# Patient Record
Sex: MALE | HISPANIC OR LATINO | Employment: FULL TIME | URBAN - METROPOLITAN AREA
[De-identification: names, ages, dates, MRNs, and addresses within clinical notes are randomized per-mention and may not be internally consistent; named-entity substitution may affect disease eponyms.]

---

## 2020-10-16 ENCOUNTER — OFFICE VISIT (OUTPATIENT)
Dept: ENDOCRINOLOGY | Facility: CLINIC | Age: 60
End: 2020-10-16

## 2020-10-16 VITALS
SYSTOLIC BLOOD PRESSURE: 124 MMHG | HEART RATE: 77 BPM | OXYGEN SATURATION: 98 % | BODY MASS INDEX: 35.17 KG/M2 | WEIGHT: 206 LBS | HEIGHT: 64 IN | TEMPERATURE: 97.3 F | DIASTOLIC BLOOD PRESSURE: 70 MMHG

## 2020-10-16 DIAGNOSIS — IMO0002 DIABETES MELLITUS TYPE 2, UNCONTROLLED, WITH COMPLICATIONS: Primary | ICD-10-CM

## 2020-10-16 DIAGNOSIS — I10 BENIGN HYPERTENSION: ICD-10-CM

## 2020-10-16 LAB
EXPIRATION DATE: NORMAL
HBA1C MFR BLD: 8 %
Lab: NORMAL

## 2020-10-16 PROCEDURE — 83036 HEMOGLOBIN GLYCOSYLATED A1C: CPT | Performed by: INTERNAL MEDICINE

## 2020-10-16 PROCEDURE — 99213 OFFICE O/P EST LOW 20 MIN: CPT | Performed by: INTERNAL MEDICINE

## 2020-10-16 RX ORDER — EMPAGLIFLOZIN 25 MG/1
1 TABLET, FILM COATED ORAL DAILY
COMMUNITY
End: 2021-01-22 | Stop reason: SDUPTHER

## 2020-10-16 RX ORDER — BLOOD SUGAR DIAGNOSTIC
STRIP MISCELLANEOUS
COMMUNITY
End: 2021-01-22 | Stop reason: SDUPTHER

## 2020-10-16 RX ORDER — GLIMEPIRIDE 4 MG/1
1 TABLET ORAL 2 TIMES DAILY
COMMUNITY
End: 2021-01-22 | Stop reason: SDUPTHER

## 2020-10-16 RX ORDER — ROSUVASTATIN CALCIUM 20 MG/1
1 TABLET, COATED ORAL NIGHTLY
COMMUNITY
End: 2021-01-22 | Stop reason: SDUPTHER

## 2020-10-16 RX ORDER — PIOGLITAZONEHYDROCHLORIDE 45 MG/1
1 TABLET ORAL DAILY
COMMUNITY
End: 2021-01-22 | Stop reason: SDUPTHER

## 2020-10-16 RX ORDER — LISINOPRIL 40 MG/1
1 TABLET ORAL DAILY
COMMUNITY
End: 2021-01-22 | Stop reason: SDUPTHER

## 2020-10-16 RX ORDER — SEMAGLUTIDE 1.34 MG/ML
INJECTION, SOLUTION SUBCUTANEOUS
COMMUNITY
End: 2021-01-22 | Stop reason: SDUPTHER

## 2020-10-16 NOTE — PROGRESS NOTES
"     Office Note      Date: 10/16/2020  Patient Name: Vu Alberto  MRN: [id number]  : 1960    Chief Complaint   Patient presents with   • Follow-up   • Diabetes       History of Present Illness:   Vu Alberto is a 60 y.o. male who presents for Diabetes type 2. Diagnosed in: . Treated in past with oral agents. Current treatments: ozempic and oral agents. Number of insulin shots per day: none. Checks blood sugar 1 times a day. Has low blood sugar: no. Aspirin use: Yes. Statin use: Yes. ACE-I/ARB use: Yes. Changes in health since last visit: none. Last eye exam .    Subjective      Diabetic Complications:  Eyes: No  Kidneys: No  Feet: No  Heart: No    Diet and Exercise:  Meals per day: 3  Minutes of exercise per week: 150 mins.    Review of Systems:   Review of Systems   Constitutional: Negative.    Cardiovascular: Negative.    Gastrointestinal: Negative.    Endocrine: Negative.        The following portions of the patient's history were reviewed and updated as appropriate: allergies, current medications, past family history, past medical history, past social history, past surgical history and problem list.    Objective       Visit Vitals  /70 (BP Location: Left arm, Patient Position: Sitting, Cuff Size: Adult)   Pulse 77   Temp 97.3 °F (36.3 °C)   Ht 162.6 cm (64\")   Wt 93.4 kg (206 lb)   SpO2 98%   BMI 35.36 kg/m²       Physical Exam:  Physical Exam  Constitutional:       Appearance: Normal appearance.   Neurological:      Mental Status: He is alert.         Labs:    HbA1c  Lab Results   Component Value Date    HGBA1C 8.0 10/16/2020       CMP  No results found for: GLUCOSE, BUN, CREATININE, EGFRIFNONA, EGFRIFAFRI, BCR, K, CO2, CALCIUM, PROTENTOTREF, LABIL2, BILIRUBIN, AST, ALT     Lipid Panel        TSH  No results found for: TSH, FREET4     Hemoglobin A1C  Lab Results   Component Value Date    HGBA1C 8.0 10/16/2020        Microalbumin/Creatinine  No results found for: ROBERT " CREATINIURIN, MICROALBUR        Assessment / Plan      Assessment & Plan:  Problem List Items Addressed This Visit        Cardiovascular and Mediastinum    Benign hypertension    Current Assessment & Plan     Hypertension is unchanged.  Continue current treatment regimen.  Blood pressure will be reassessed in 3 months.         Relevant Medications    lisinopril (PRINIVIL,ZESTRIL) 40 MG tablet       Endocrine    Diabetes mellitus type 2, uncontrolled, with complications (CMS/HCC) - Primary    Current Assessment & Plan     Diabetes is unchanged.   Continue current treatment regimen.  Diabetes will be reassessed in 3 months.    Work on diet/exercise/weight loss.         Relevant Medications    glimepiride (AMARYL) 4 MG tablet    Empagliflozin (Jardiance) 25 MG tablet    metFORMIN (GLUCOPHAGE) 1000 MG tablet    Semaglutide, 1 MG/DOSE, (Ozempic, 1 MG/DOSE,) 2 MG/1.5ML solution pen-injector    pioglitazone (ACTOS) 45 MG tablet    Other Relevant Orders    POC Glycosylated Hemoglobin (Hb A1C) (Completed)           Return in about 3 months (around 1/16/2021) for Recheck with A1c.    Adan Swain MD   10/16/2020

## 2020-10-16 NOTE — ASSESSMENT & PLAN NOTE
Diabetes is unchanged.   Continue current treatment regimen.  Diabetes will be reassessed in 3 months.    Work on diet/exercise/weight loss.

## 2021-01-22 ENCOUNTER — OFFICE VISIT (OUTPATIENT)
Dept: ENDOCRINOLOGY | Facility: CLINIC | Age: 61
End: 2021-01-22

## 2021-01-22 VITALS
SYSTOLIC BLOOD PRESSURE: 122 MMHG | WEIGHT: 204.4 LBS | HEART RATE: 91 BPM | OXYGEN SATURATION: 98 % | BODY MASS INDEX: 34.89 KG/M2 | TEMPERATURE: 97.5 F | DIASTOLIC BLOOD PRESSURE: 74 MMHG | HEIGHT: 64 IN

## 2021-01-22 DIAGNOSIS — I10 BENIGN HYPERTENSION: ICD-10-CM

## 2021-01-22 DIAGNOSIS — E11.65 UNCONTROLLED TYPE 2 DIABETES MELLITUS WITH HYPERGLYCEMIA (HCC): Primary | ICD-10-CM

## 2021-01-22 LAB
EXPIRATION DATE: NORMAL
HBA1C MFR BLD: 8.3 %
Lab: NORMAL

## 2021-01-22 PROCEDURE — 83036 HEMOGLOBIN GLYCOSYLATED A1C: CPT | Performed by: INTERNAL MEDICINE

## 2021-01-22 PROCEDURE — 99213 OFFICE O/P EST LOW 20 MIN: CPT | Performed by: INTERNAL MEDICINE

## 2021-01-22 RX ORDER — EMPAGLIFLOZIN 25 MG/1
1 TABLET, FILM COATED ORAL DAILY
Qty: 90 TABLET | Refills: 3 | Status: SHIPPED | OUTPATIENT
Start: 2021-01-22 | End: 2022-02-23 | Stop reason: SDUPTHER

## 2021-01-22 RX ORDER — LISINOPRIL 40 MG/1
40 TABLET ORAL DAILY
Qty: 90 TABLET | Refills: 3 | Status: SHIPPED | OUTPATIENT
Start: 2021-01-22 | End: 2021-06-24

## 2021-01-22 RX ORDER — BLOOD SUGAR DIAGNOSTIC
STRIP MISCELLANEOUS
Qty: 100 EACH | Refills: 3 | Status: SHIPPED | OUTPATIENT
Start: 2021-01-22 | End: 2021-07-12

## 2021-01-22 RX ORDER — SEMAGLUTIDE 1.34 MG/ML
1 INJECTION, SOLUTION SUBCUTANEOUS WEEKLY
Qty: 9 ML | Refills: 3 | Status: SHIPPED | OUTPATIENT
Start: 2021-01-22 | End: 2022-02-23 | Stop reason: SDUPTHER

## 2021-01-22 RX ORDER — PIOGLITAZONEHYDROCHLORIDE 45 MG/1
45 TABLET ORAL DAILY
Qty: 90 TABLET | Refills: 3 | Status: SHIPPED | OUTPATIENT
Start: 2021-01-22 | End: 2021-04-06 | Stop reason: SDUPTHER

## 2021-01-22 RX ORDER — ROSUVASTATIN CALCIUM 20 MG/1
20 TABLET, COATED ORAL NIGHTLY
Qty: 90 TABLET | Refills: 3 | Status: SHIPPED | OUTPATIENT
Start: 2021-01-22 | End: 2022-02-23 | Stop reason: SDUPTHER

## 2021-01-22 RX ORDER — GLIMEPIRIDE 4 MG/1
4 TABLET ORAL 2 TIMES DAILY
Qty: 180 TABLET | Refills: 3 | Status: SHIPPED | OUTPATIENT
Start: 2021-01-22 | End: 2022-02-23 | Stop reason: SDUPTHER

## 2021-01-22 NOTE — ASSESSMENT & PLAN NOTE
Diabetes is worsening.   Continue current treatment regimen.  Diabetes will be reassessed in 3 months.    He will try to work on diet/exercise/weight loss for the next 3 months before we add another DM medication.

## 2021-01-22 NOTE — PROGRESS NOTES
"     Office Note      Date: 2021  Patient Name: Vu Alberto  MRN: [id number]  : 1960    Chief Complaint   Patient presents with   • Follow-up   • Diabetes       History of Present Illness:   Vu Alberto is a 60 y.o. male who presents for Diabetes type 2. Diagnosed in: . Treated in past with oral agents. Current treatments: ozempic and oral agents. Number of insulin shots per day: none. Checks blood sugar 1 times a day. Has low blood sugar: no. Aspirin use: Yes. Statin use: Yes. ACE-I/ARB use: Yes. Changes in health since last visit: none. Last eye exam .    Subjective      Diabetic Complications:  Eyes: No  Kidneys: No  Feet: No  Heart: No    Diet and Exercise:  Meals per day: 3  Minutes of exercise per week: 150 mins.    Review of Systems:   Review of Systems   Cardiovascular: Negative.    Gastrointestinal: Negative.    Endocrine: Negative.        The following portions of the patient's history were reviewed and updated as appropriate: allergies, current medications, past family history, past medical history, past social history, past surgical history and problem list.    Objective       Visit Vitals  /74   Pulse 91   Temp 97.5 °F (36.4 °C) (Infrared)   Ht 162.6 cm (64\")   Wt 92.7 kg (204 lb 6.4 oz)   SpO2 98%   BMI 35.09 kg/m²       Physical Exam:  Physical Exam  Constitutional:       Appearance: Normal appearance.   Neurological:      Mental Status: He is alert.         Labs:    HbA1c  Lab Results   Component Value Date    HGBA1C 8.3 2021       CMP  No results found for: GLUCOSE, BUN, CREATININE, EGFRIFNONA, EGFRIFAFRI, BCR, K, CO2, CALCIUM, PROTENTOTREF, LABIL2, BILIRUBIN, AST, ALT     Lipid Panel        TSH  No results found for: TSH, FREET4     Hemoglobin A1C  Lab Results   Component Value Date    HGBA1C 8.3 2021        Microalbumin/Creatinine  No results found for: MALBCRERATIO, CREATINIURIN, MICROALBUR        Assessment / Plan      Assessment & Plan:  Problem List " Items Addressed This Visit        Other    Diabetes mellitus type 2, uncontrolled, with complications (CMS/HCC) - Primary    Current Assessment & Plan     Diabetes is worsening.   Continue current treatment regimen.  Diabetes will be reassessed in 3 months.    He will try to work on diet/exercise/weight loss for the next 3 months before we add another DM medication.         Relevant Medications    Empagliflozin (Jardiance) 25 MG tablet    glimepiride (AMARYL) 4 MG tablet    metFORMIN (GLUCOPHAGE) 1000 MG tablet    pioglitazone (ACTOS) 45 MG tablet    Semaglutide, 1 MG/DOSE, (Ozempic, 1 MG/DOSE,) 2 MG/1.5ML solution pen-injector    Benign hypertension    Current Assessment & Plan     Hypertension is unchanged.  Continue current treatment regimen.  Blood pressure will be reassessed in 3 months.         Relevant Medications    lisinopril (PRINIVIL,ZESTRIL) 40 MG tablet           Return in about 3 months (around 4/22/2021) for Recheck with A1c, CMP, lipids, TSH, microalbumin.    Adan Swain MD   01/22/2021

## 2021-03-29 ENCOUNTER — TELEPHONE (OUTPATIENT)
Dept: ENDOCRINOLOGY | Facility: CLINIC | Age: 61
End: 2021-03-29

## 2021-04-06 RX ORDER — PIOGLITAZONEHYDROCHLORIDE 45 MG/1
45 TABLET ORAL DAILY
Qty: 90 TABLET | Refills: 3 | Status: SHIPPED | OUTPATIENT
Start: 2021-04-06 | End: 2022-02-23 | Stop reason: SDUPTHER

## 2021-05-21 ENCOUNTER — LAB (OUTPATIENT)
Dept: LAB | Facility: HOSPITAL | Age: 61
End: 2021-05-21

## 2021-05-21 ENCOUNTER — OFFICE VISIT (OUTPATIENT)
Dept: ENDOCRINOLOGY | Facility: CLINIC | Age: 61
End: 2021-05-21

## 2021-05-21 VITALS
SYSTOLIC BLOOD PRESSURE: 126 MMHG | HEIGHT: 64 IN | BODY MASS INDEX: 35.07 KG/M2 | DIASTOLIC BLOOD PRESSURE: 78 MMHG | HEART RATE: 80 BPM | WEIGHT: 205.4 LBS | OXYGEN SATURATION: 96 %

## 2021-05-21 DIAGNOSIS — E11.65 UNCONTROLLED TYPE 2 DIABETES MELLITUS WITH HYPERGLYCEMIA (HCC): Primary | ICD-10-CM

## 2021-05-21 DIAGNOSIS — I10 BENIGN HYPERTENSION: ICD-10-CM

## 2021-05-21 DIAGNOSIS — E11.65 UNCONTROLLED TYPE 2 DIABETES MELLITUS WITH HYPERGLYCEMIA (HCC): ICD-10-CM

## 2021-05-21 LAB
ALBUMIN SERPL-MCNC: 4.4 G/DL (ref 3.5–5.2)
ALBUMIN UR-MCNC: 1.5 MG/DL
ALBUMIN/GLOB SERPL: 1.7 G/DL
ALP SERPL-CCNC: 85 U/L (ref 39–117)
ALT SERPL W P-5'-P-CCNC: 29 U/L (ref 1–41)
ANION GAP SERPL CALCULATED.3IONS-SCNC: 12.9 MMOL/L (ref 5–15)
AST SERPL-CCNC: 23 U/L (ref 1–40)
BILIRUB SERPL-MCNC: 0.2 MG/DL (ref 0–1.2)
BUN SERPL-MCNC: 13 MG/DL (ref 8–23)
BUN/CREAT SERPL: 21.7 (ref 7–25)
CALCIUM SPEC-SCNC: 10 MG/DL (ref 8.6–10.5)
CHLORIDE SERPL-SCNC: 96 MMOL/L (ref 98–107)
CHOLEST SERPL-MCNC: 123 MG/DL (ref 0–200)
CO2 SERPL-SCNC: 26.1 MMOL/L (ref 22–29)
CREAT SERPL-MCNC: 0.6 MG/DL (ref 0.76–1.27)
CREAT UR-MCNC: 30.2 MG/DL
EXPIRATION DATE: NORMAL
EXPIRATION DATE: NORMAL
GFR SERPL CREATININE-BSD FRML MDRD: 137 ML/MIN/1.73
GFR SERPL CREATININE-BSD FRML MDRD: >150 ML/MIN/1.73
GLOBULIN UR ELPH-MCNC: 2.6 GM/DL
GLUCOSE BLDC GLUCOMTR-MCNC: 126 MG/DL (ref 70–130)
GLUCOSE SERPL-MCNC: 96 MG/DL (ref 65–99)
HBA1C MFR BLD: 8 %
HDLC SERPL-MCNC: 37 MG/DL (ref 40–60)
LDLC SERPL CALC-MCNC: 53 MG/DL (ref 0–100)
LDLC/HDLC SERPL: 1.23 {RATIO}
Lab: NORMAL
Lab: NORMAL
MICROALBUMIN/CREAT UR: 49.7 MG/G
POTASSIUM SERPL-SCNC: 4.6 MMOL/L (ref 3.5–5.2)
PROT SERPL-MCNC: 7 G/DL (ref 6–8.5)
SODIUM SERPL-SCNC: 135 MMOL/L (ref 136–145)
TRIGL SERPL-MCNC: 202 MG/DL (ref 0–150)
TSH SERPL DL<=0.05 MIU/L-ACNC: 1.63 UIU/ML (ref 0.27–4.2)
VLDLC SERPL-MCNC: 33 MG/DL (ref 5–40)

## 2021-05-21 PROCEDURE — 80053 COMPREHEN METABOLIC PANEL: CPT

## 2021-05-21 PROCEDURE — 83036 HEMOGLOBIN GLYCOSYLATED A1C: CPT | Performed by: INTERNAL MEDICINE

## 2021-05-21 PROCEDURE — 82043 UR ALBUMIN QUANTITATIVE: CPT

## 2021-05-21 PROCEDURE — 82570 ASSAY OF URINE CREATININE: CPT

## 2021-05-21 PROCEDURE — 99214 OFFICE O/P EST MOD 30 MIN: CPT | Performed by: INTERNAL MEDICINE

## 2021-05-21 PROCEDURE — 84443 ASSAY THYROID STIM HORMONE: CPT

## 2021-05-21 PROCEDURE — 82947 ASSAY GLUCOSE BLOOD QUANT: CPT | Performed by: INTERNAL MEDICINE

## 2021-05-21 PROCEDURE — 80061 LIPID PANEL: CPT

## 2021-05-21 RX ORDER — BROMOCRIPTINE MESYLATE 0.8 MG/1
4 TABLET ORAL
Qty: 120 TABLET | Refills: 5 | Status: SHIPPED | OUTPATIENT
Start: 2021-05-21 | End: 2022-02-01

## 2021-05-21 NOTE — PROGRESS NOTES
"     Office Note      Date: 2021  Patient Name: Vu Alberto  MRN: [id number]  : 1960    Chief Complaint   Patient presents with   • Follow-up   • Diabetes       History of Present Illness:   Vu Alberto is a 61 y.o. male who presents for Diabetes type 2. Diagnosed in: . Treated in past with oral agents. Current treatments: ozempic and oral agents. Number of insulin shots per day: none. Checks blood sugar 1 times a day. Has low blood sugar: no. Aspirin use: Yes. Statin use: Yes. ACE-I/ARB use: Yes. Changes in health since last visit: none. Last eye exam .    Subjective      Diabetic Complications:  Eyes: No  Kidneys: No  Feet: No  Heart: No    Diet and Exercise:  Meals per day: 3  Minutes of exercise per week: 150 mins.    Review of Systems:   Review of Systems   Constitutional: Negative.    Cardiovascular: Negative.    Gastrointestinal: Negative.    Endocrine: Negative.        The following portions of the patient's history were reviewed and updated as appropriate: allergies, current medications, past family history, past medical history, past social history, past surgical history and problem list.    Objective       Visit Vitals  /78   Pulse 80   Ht 162.6 cm (64\")   Wt 93.2 kg (205 lb 6.4 oz)   SpO2 96%   BMI 35.26 kg/m²       Physical Exam:  Physical Exam  Constitutional:       Appearance: Normal appearance.   Cardiovascular:      Pulses:           Dorsalis pedis pulses are 2+ on the right side and 2+ on the left side.        Posterior tibial pulses are 2+ on the right side and 2+ on the left side.   Feet:      Right foot:      Protective Sensation: 5 sites tested. 5 sites sensed.      Skin integrity: Skin integrity normal.      Toenail Condition: Right toenails are abnormally thick.      Left foot:      Protective Sensation: 5 sites tested. 5 sites sensed.      Skin integrity: Skin integrity normal.      Toenail Condition: Left toenails are abnormally thick.   Neurological:      " Mental Status: He is alert.         Labs:    HbA1c  Lab Results   Component Value Date    HGBA1C 8.0 05/21/2021       CMP  No results found for: GLUCOSE, BUN, CREATININE, EGFRIFNONA, EGFRIFAFRI, BCR, K, CO2, CALCIUM, PROTENTOTREF, LABIL2, BILIRUBIN, AST, ALT     Lipid Panel        TSH  No results found for: TSH, FREET4     Hemoglobin A1C  Lab Results   Component Value Date    HGBA1C 8.0 05/21/2021        Microalbumin/Creatinine  No results found for: MALBCRERATIO, CREATINIURIN, MICROALBUR        Assessment / Plan      Assessment & Plan:  Diagnoses and all orders for this visit:    1. Uncontrolled type 2 diabetes mellitus with hyperglycemia (CMS/[facility]) (Primary)  Assessment & Plan:  Diabetes is improving with treatment.   Continue current treatment regimen.  Diabetes will be reassessed in 3 months.    A1c slightly better but still above goal.    Orders:  -     POC Glucose, Blood  -     POC Glycosylated Hemoglobin (Hb A1C)  -     Comprehensive Metabolic Panel; Future  -     Lipid Panel; Future  -     Microalbumin / Creatinine Urine Ratio - Urine, Clean Catch; Future  -     TSH; Future    2. Benign hypertension  Assessment & Plan:  Hypertension is unchanged.  Continue current treatment regimen.  Blood pressure will be reassessed at the next regular appointment.        Return in about 3 months (around 8/21/2021) for Recheck with A1c.    Adan Swain MD   05/21/2021

## 2021-05-21 NOTE — ASSESSMENT & PLAN NOTE
Diabetes is improving with treatment.   Continue current treatment regimen.  Diabetes will be reassessed in 3 months.    A1c slightly better but still above goal.  We discussed adding cycloset.

## 2021-05-26 ENCOUNTER — PRIOR AUTHORIZATION (OUTPATIENT)
Dept: ENDOCRINOLOGY | Facility: CLINIC | Age: 61
End: 2021-05-26

## 2021-05-26 NOTE — TELEPHONE ENCOUNTER
Vu Alberto Key: [id number] - PA Case ID: [id number] help? Call us at [phone]  Outcome  Approvedtoday  PA Case: [id number], Status: Approved, Coverage Starts on: 5/26/2021 12:00:00 AM, Coverage Ends on: 5/26/2022 12:00:00 AM.  Drug  Cycloset 0.8MG tablets  Form  [employer] Electronic PA Form (2017 NCPDP)    PHAR NOTIFIED. PHAR GOING TO NOTIFY PT.

## 2021-06-17 ENCOUNTER — TELEPHONE (OUTPATIENT)
Dept: ENDOCRINOLOGY | Facility: CLINIC | Age: 61
End: 2021-06-17

## 2021-06-24 RX ORDER — LISINOPRIL 40 MG/1
40 TABLET ORAL DAILY
Qty: 90 TABLET | Refills: 3 | Status: SHIPPED | OUTPATIENT
Start: 2021-06-24 | End: 2022-02-23 | Stop reason: SDUPTHER

## 2021-06-25 ENCOUNTER — PRIOR AUTHORIZATION (OUTPATIENT)
Dept: ENDOCRINOLOGY | Facility: CLINIC | Age: 61
End: 2021-06-25

## 2021-06-25 NOTE — TELEPHONE ENCOUNTER
PRIYANKCHRISTINE NIEVES (Key: [id number])  Rx #: [id number]  Ozempic (1 MG/DOSE) 2MG/1.5ML pen-injectors     Form  [facility] Electronic PA Form (2017 NCPDP)  Created  20 hours ago  Sent to Plan  3 hours ago  Plan Response  3 hours ago  Submit Clinical Questions  3 hours ago  Determination  Favorable  27 minutes ago  Message from Plan  Your PA request has been approved. Additional information will be provided in the approval communication. (Message 1145)    PHAR NOTIFIED

## 2021-06-28 ENCOUNTER — TELEPHONE (OUTPATIENT)
Dept: ENDOCRINOLOGY | Facility: CLINIC | Age: 61
End: 2021-06-28

## 2021-06-28 NOTE — TELEPHONE ENCOUNTER
GABRIELA CALLED TO CHECK ON THE PA FOR JARDIANCE   THEY ALSO WANTED TO KNOW IF WE HAVE SAMPLES OF IT [facility] NUMBER  [phone]  GABRIELA NUMBER  [phone]

## 2021-06-28 NOTE — TELEPHONE ENCOUNTER
PATIENT'S SPOUSE CALLED FOR REFILL ON CONTOUR NEXT TEST STRIPS. SPOUSE WOULD ALSO LLIKE AN UPDATE ON PRIOR AUTH FOR JARDIANCE AND WOULD LIKE TO KNOW IF THERE ARE SAMPLES AVAILABLE TO  AS THE PATIENT HAS BEEN WITHOUT FOR SOME TIME.     CALL BACK [phone]

## 2021-07-01 ENCOUNTER — PRIOR AUTHORIZATION (OUTPATIENT)
Dept: ENDOCRINOLOGY | Facility: CLINIC | Age: 61
End: 2021-07-01

## 2021-07-01 NOTE — TELEPHONE ENCOUNTER
PRIYANK NIEVES Key: [id number] - PA Case ID: [id number] - Rx #: [id number] help? Call us at [phone]  Outcome  Approvedtoday  Your PA request has been approved. Additional information will be provided in the approval communication. (Message 1145)  Drug  Jardiance 25MG tablets  Form  [employer] Electronic PA Form (2017 NCPDP)  Original Claim Info  76,75 MUST MEET STEP OR MD CALL [phone] THERAPY IS REQUIREDDRUG REQUIRES PRIOR AUTHORIZATION(PHARMACY HELP DESK [phone])    Phar notified.

## 2021-07-12 RX ORDER — PERPHENAZINE 16 MG/1
TABLET, FILM COATED ORAL
Qty: 100 EACH | Refills: 3 | Status: SHIPPED | OUTPATIENT
Start: 2021-07-12 | End: 2022-02-23

## 2021-07-15 RX ORDER — GLIMEPIRIDE 4 MG/1
TABLET ORAL
Qty: 180 TABLET | Refills: 3 | OUTPATIENT
Start: 2021-07-15

## 2021-07-15 NOTE — TELEPHONE ENCOUNTER
Duplicate request RX E- Scripted for 90 days with 3 refills on  glimepiride (AMARYL) 4 MG tablet [[id number]]     Order Details  Dose: 4 mg Route: Oral Frequency: 2 Times Daily   Dispense Quantity: 180 tablet Refills: 3          Sig: Take 1 tablet by mouth 2 (Two) Times a Day.         Start Date: 01/22/21 End Date: --   Written Date: 01/22/21 Expiration Date: 01/22/22   Original Order:  glimepiride (AMARYL) 4 MG tablet [[id number]]   Providers    Ordering Provider and Authorizing Provider:    Adan Swain MD   [address] [place] [address]   Phone:  [phone]   Fax:  [phone]   NPI:  [id number]        Ordering User:  Adan Swain MD        Pharmacy    [facility] #[id number] [place] [address] AT [facility] - [phone]  - [phone]    [address] [id number]   Phone:  [phone]  Fax:  [phone]   Orders with any of the following pharmaceutical classes: Antidiabetic    Name Dose Frequency Start Date End Date Medication Warnings Interventions? Order Mode    metFORMIN (GLUCOPHAGE) 1000 MG tablet 1,000 mg 2 Times Daily 01/22/21    Outpatient    Empagliflozin (Jardiance) 25 MG tablet 1 tablet Daily 01/22/21    Outpatient    Semaglutide, 1 MG/DOSE, (Ozempic, 1 MG/DOSE,) 2 MG/1.5ML solution pen-injector 1 mg Weekly 01/22/21    Outpatient    pioglitazone (ACTOS) 45 MG tablet 45 mg Daily 04/06/21    Outpatient    Cycloset 0.8 MG tablet 4 tablet Daily With Breakfast 05/21/21    Outpatient    Warnings History    Total number of overridden warnings: 1   Full Warnings History   Pharmacist Clinical Review History    This prescription has not been clinically reviewed.   Order Reconciliation Actions       Order Reconciliation Actions   E-Prescribing Status    Outpatient Medication Detail    glimepiride (AMARYL) 4 MG tablet        Sig: Take 1 tablet by mouth 2 (Two) Times a Day.        Sent to pharmacy as:  Glimepiride 4 MG Oral Tablet (AMARYL)        Class: Normal        Route: Oral        E-Prescribing Status: Receipt confirmed by pharmacy (1/22/2021 11:16 AM EST)

## 2021-08-23 ENCOUNTER — PRIOR AUTHORIZATION (OUTPATIENT)
Dept: ENDOCRINOLOGY | Facility: CLINIC | Age: 61
End: 2021-08-23

## 2021-08-23 NOTE — TELEPHONE ENCOUNTER
You do not meet the requirements of your plan. Your plan approved VF Formulary  Exception (VF Standard) criteria covers this drug when your doctor provides  documentation that you meet one of these conditions:  - You have a clinical condition for which there is no appropriate formulary alternative  - You need a form of the drug that is not on the formulary  - You tried the required number of preferred formulary alternatives on your formulary first.  These drugs did not work for you or you cannot take them.  Your request has been denied based on the information we have. Examples (may vary  based on your plan) of alternatives to the requested drug are: metformin, metformin  extended-release (generic glucophage XR), alogliptin-metformin, Janumet, Janumet XR,  Januvia. Your prescriber will be responsible for determining what alternative is appropriate  for you. This list may not include all alternatives covered by your plan and may include  alternatives that require prior authorization. Please refer to your plan documents for a  complete list of alternatives. Requirement: you must have tried 3 alternatives if there are 3  or more covered alternatives, 2 alternatives if there are 2 covered alternatives, or 1  alternative if there is only 1 covered alternative

## 2021-08-25 ENCOUNTER — TELEPHONE (OUTPATIENT)
Dept: ENDOCRINOLOGY | Facility: CLINIC | Age: 61
End: 2021-08-25

## 2021-08-25 NOTE — TELEPHONE ENCOUNTER
Spoke with Mr. Alberto's patient contact, Treasure (wife), via telephone this afternoon. Advised her that patient's Cycloset PA has been approved and should be covered under insurance plan at this time. Wife expressed understanding and thanked writer for call. Plan to  from local pharmacy soon.     Arti Huff, PharmD  8/25/2021  13:01 EDT   04-Oct-2019 22:22

## 2021-09-27 RX ORDER — BLOOD-GLUCOSE METER
1 EACH MISCELLANEOUS SEE ADMIN INSTRUCTIONS
Qty: 1 KIT | Refills: 0 | Status: SHIPPED | OUTPATIENT
Start: 2021-09-27

## 2021-09-27 RX ORDER — BLOOD SUGAR DIAGNOSTIC
STRIP MISCELLANEOUS
Qty: 100 EACH | Refills: 3 | Status: SHIPPED | OUTPATIENT
Start: 2021-09-27 | End: 2022-02-23

## 2021-09-27 RX ORDER — LANCETS
EACH MISCELLANEOUS
Qty: 100 EACH | Refills: 3 | Status: SHIPPED | OUTPATIENT
Start: 2021-09-27

## 2021-09-27 NOTE — TELEPHONE ENCOUNTER
Left message for patient to call the clinic for results.  Lab orders entered.   alec called pt needs a new metere called in -his insurance will pay for one touch verio  He needs meter, strips and lancets     Please  Call  [phone]

## 2021-10-12 ENCOUNTER — OFFICE VISIT (OUTPATIENT)
Dept: ENDOCRINOLOGY | Facility: CLINIC | Age: 61
End: 2021-10-12

## 2021-10-12 ENCOUNTER — MEDICATION THERAPY MANAGEMENT (OUTPATIENT)
Dept: ENDOCRINOLOGY | Facility: CLINIC | Age: 61
End: 2021-10-12

## 2021-10-12 VITALS
BODY MASS INDEX: 35 KG/M2 | WEIGHT: 205 LBS | HEART RATE: 62 BPM | SYSTOLIC BLOOD PRESSURE: 122 MMHG | OXYGEN SATURATION: 97 % | HEIGHT: 64 IN | DIASTOLIC BLOOD PRESSURE: 78 MMHG

## 2021-10-12 DIAGNOSIS — I10 BENIGN HYPERTENSION: ICD-10-CM

## 2021-10-12 DIAGNOSIS — E78.2 MIXED HYPERLIPIDEMIA: ICD-10-CM

## 2021-10-12 DIAGNOSIS — E11.65 UNCONTROLLED TYPE 2 DIABETES MELLITUS WITH HYPERGLYCEMIA (HCC): Primary | ICD-10-CM

## 2021-10-12 LAB
EXPIRATION DATE: ABNORMAL
EXPIRATION DATE: NORMAL
GLUCOSE BLDC GLUCOMTR-MCNC: 175 MG/DL (ref 70–130)
HBA1C MFR BLD: 7.7 %
Lab: ABNORMAL
Lab: NORMAL

## 2021-10-12 PROCEDURE — 83036 HEMOGLOBIN GLYCOSYLATED A1C: CPT | Performed by: INTERNAL MEDICINE

## 2021-10-12 PROCEDURE — 99214 OFFICE O/P EST MOD 30 MIN: CPT | Performed by: INTERNAL MEDICINE

## 2021-10-12 PROCEDURE — 82947 ASSAY GLUCOSE BLOOD QUANT: CPT | Performed by: INTERNAL MEDICINE

## 2021-10-12 NOTE — PROGRESS NOTES
"     Office Note      Date: 10/12/2021  Patient Name: Vu Alberto  MRN: [id number]  : 1960    Chief Complaint   Patient presents with   • Diabetes     3 month f/u       History of Present Illness:   Vu Alberto is a 61 y.o. male who presents for Diabetes type 2. Diagnosed in: . Treated in past with oral agents. Current treatments: ozempic, glimepiride, metformin, jardiance, cycloset and pioglitazone. Number of insulin shots per day: none. Checks blood sugar every other day. Has low blood sugar: no. Aspirin use: Yes. Statin use: Yes. ACE-I/ARB use: Yes. Changes in health since last visit: none. Last eye exam .    Subjective      Diabetic Complications:  Eyes: No  Kidneys: No  Feet: No  Heart: No    Diet and Exercise:  Meals per day: 3  Minutes of exercise per week: 150 mins.    Review of Systems:   Review of Systems   Constitutional: Negative.    Cardiovascular: Negative.    Gastrointestinal: Negative.    Endocrine: Negative.        The following portions of the patient's history were reviewed and updated as appropriate: allergies, current medications, past family history, past medical history, past social history, past surgical history and problem list.    Objective       Visit Vitals  /78 (BP Location: Left arm, Patient Position: Sitting, Cuff Size: Adult)   Pulse 62   Ht 162.6 cm (64\")   Wt 93 kg (205 lb)   SpO2 97%   BMI 35.19 kg/m²       Physical Exam:  Physical Exam  Constitutional:       Appearance: Normal appearance.   Neurological:      Mental Status: He is alert.         Labs:    HbA1c  Lab Results   Component Value Date    HGBA1C 7.7 10/12/2021       CMP  Lab Results   Component Value Date    GLUCOSE 96 2021    BUN 13 2021    CREATININE 0.60 (L) 2021    EGFRIFNONA 137 2021    EGFRIFAFRI >150 2021    BCR 21.7 2021    K 4.6 2021    CO2 26.1 2021    CALCIUM 10.0 2021    AST 23 2021    ALT 29 2021        Lipid Panel  Lab " Results   Component Value Date    HDL 37 (L) 05/21/2021    LDL 53 05/21/2021    TRIG 202 (H) 05/21/2021        TSH  Lab Results   Component Value Date    TSH 1.630 05/21/2021        Hemoglobin A1C  Lab Results   Component Value Date    HGBA1C 7.7 10/12/2021        Microalbumin/Creatinine  Lab Results   Component Value Date    MALBCRERATIO 49.7 05/21/2021    MICROALBUR 1.5 05/21/2021           Assessment / Plan      Assessment & Plan:  Diagnoses and all orders for this visit:    1. Uncontrolled type 2 diabetes mellitus with hyperglycemia (HCC) (Primary)  Assessment & Plan:  Diabetes is improving with treatment.   Continue current treatment regimen.  Diabetes will be reassessed in 3 months.    Orders:  -     POC Glycosylated Hemoglobin (Hb A1C)  -     POC Glucose, Blood    2. Benign hypertension  Assessment & Plan:  Hypertension is unchanged.  Continue current treatment regimen.  Blood pressure will be reassessed at the next regular appointment.      3. Mixed hyperlipidemia  Assessment & Plan:  Continue statin.        Return in about 3 months (around 1/12/2022) for Recheck with A1c.    Adan Swain MD   10/12/2021

## 2021-10-12 NOTE — PROGRESS NOTES
MTM-DSM Pharmacy Visit [facility] Endocrinology    Vu Alberto is a 61 y.o. male with T2DM seen by Endocrinology and assessed by the Medication Management Clinic Pharmacist at [facility]. This was an Initial MTM visit for Vu Alberto.    Vu Alberto was introduced to the [facility] and allergies, PMH, and medications were reviewed.    Insurance Coverage/Eligibility:  • [facility]  • Patient is not Eligible for [facility] Services - not financially viable     Past Medical History:   Diagnosis Date   • Benign essential hypertension    • Mixed hyperlipidemia    • Obesity     body mass index 30+--obesity   • Type 2 diabetes mellitus, uncontrolled (HCC)      Social History     Socioeconomic History   • Marital status:    Tobacco Use   • Smoking status: Former Smoker   • Smokeless tobacco: Never Used   Vaping Use   • Vaping Use: Never used   Substance and Sexual Activity   • Alcohol use: Not Currently   • Drug use: Never   • Sexual activity: Defer       Medication Allergies:  Patient has no known allergies.    Current Medication List:    Current Outpatient Medications:   •  Aspirin Buf,CaCarb-MgCarb-MgO, 81 MG tablet, 1 tablet Daily., Disp: , Rfl:   •  Blood Glucose Monitoring Suppl (OneTouch Verio) w/Device kit, 1 Device See Admin Instructions. Use to check blood sugar daily., Disp: 1 kit, Rfl: 0  •  Cycloset 0.8 MG tablet, Take 4 tablets by mouth Daily With Breakfast., Disp: 120 tablet, Rfl: 5  •  Empagliflozin (Jardiance) 25 MG tablet, Take 25 mg by mouth Daily., Disp: 90 tablet, Rfl: 3  •  glimepiride (AMARYL) 4 MG tablet, Take 1 tablet by mouth 2 (Two) Times a Day., Disp: 180 tablet, Rfl: 3  •  glucose blood (Contour Next Test) test strip, Use as instructed once daily; ICD-10 E11.65, Disp: 100 each, Rfl: 3  •  glucose blood (OneTouch Verio) test strip, Test blood sugar daily., Disp: 100 each, Rfl: 3  •  Lancets (onetouch ultrasoft) lancets,  Use to test blood sugar daily., Disp: 100 each, Rfl: 3  •  lisinopril (PRINIVIL,ZESTRIL) 40 MG tablet, Take 1 tablet by mouth Daily., Disp: 90 tablet, Rfl: 3  •  metFORMIN (GLUCOPHAGE) 1000 MG tablet, Take 1 tablet by mouth 2 (Two) Times a Day., Disp: 180 tablet, Rfl: 3  •  pioglitazone (ACTOS) 45 MG tablet, Take 1 tablet by mouth Daily., Disp: 90 tablet, Rfl: 3  •  rosuvastatin (CRESTOR) 20 MG tablet, Take 1 tablet by mouth Every Night., Disp: 90 tablet, Rfl: 3  •  Semaglutide, 1 MG/DOSE, (Ozempic, 1 MG/DOSE,) 2 MG/1.5ML solution pen-injector, Inject 1 mg under the skin into the appropriate area as directed 1 (One) Time Per Week., Disp: 9 mL, Rfl: 3    Vitals/Labs:  BP: (122)/(78) 122/78   Heart Rate:  [62] 62      There were no vitals filed for this visit.  Lab Results   Component Value Date    HGBA1C 7.7 10/12/2021     Lab Results   Component Value Date    GLUCOSE 96 05/21/2021    CALCIUM 10.0 05/21/2021     (L) 05/21/2021    K 4.6 05/21/2021    CO2 26.1 05/21/2021    CL 96 (L) 05/21/2021    BUN 13 05/21/2021    CREATININE 0.60 (L) 05/21/2021    EGFRIFAFRI >150 05/21/2021    EGFRIFNONA 137 05/21/2021    BCR 21.7 05/21/2021    ANIONGAP 12.9 05/21/2021     Lab Results   Component Value Date    CHOL 123 05/21/2021    TRIG 202 (H) 05/21/2021    HDL 37 (L) 05/21/2021    LDL 53 05/21/2021     Microalbumin    Microalbumin 5/21/21   Microalbumin, Urine 1.5              Drug-Drug Interactions:   • No clinically significant DDI identified per chart review     Medication Assessment:   1. Aspirin - Currently Taking  2. Statin - Currently Taking  3. ACEi/ARB - Currently Taking    Medication Education on Specialty Medication:  The patient was provided medication education via verbal information on current target medications. Patient expressed understanding and had no further questions at this time.    Jardiance (empagliflozin)  · Discussed the medication's MOA and that it may cause more frequent urination particularly  upon starting the medication  · Take one tablet in the morning with or without food    · Encouraged to drink plenty of water as to not get dehydrated especially in warmer weather or with activity  · Also discussed there may be an increased incidence of UTIs or yeast infections and to monitor for signs/symptoms    Ozempic (semaglutide)   • Discussed the medication's MOA and that it helps you feel full, helps your body release more insulin and helps your body make less sugar after meals.  • Administer by subcutaneous injection into the abdomen, thigh, or upper arm on the same day each week without regard to food. Rotate injection sites weekly if injecting in the same area of the body and use a new needle every time Ozempic is used. Do not mix with other products.   o For each new prefilled pen, prime the needle before the first injection by turning the dose selector to the flow check symbol and injecting into the air (priming is not required for subsequent injections). Once injected, continue to depress the button until the dial has returned to 0 and for an additional 6 seconds. Then, remove the needle and discard in sharps container.  • Unopened pens should be stored in refrigerator, opened pens may be stored in refrigerator or at room temperature for up to 56 days  • If you miss a dose, take it as soon as you remember and then get back on schedule  o If it has been > 5 days, skip that dose and get back on your regular schedule allowing at least 48 hours between 2 doses.   o Never double up doses to make up for a missed dose  • Nausea, vomiting, and diarrhea are most common when first starting Ozempic, but they should decrease over time  • Make sure to eat smaller meals throughout the day versus larger meals and this should help with GI symptoms as well.      Assessment & Plan:  1. Provider Changes This Visit: None  2. Therapy Modifications Recommended: None at This Time   3. Provided contact information for the  pharmacy team and discussed [facility] services available to patient, including: monitoring of refills, prior authorizations, and copay coupon cards, as applicable, as well as curb-side pick-up or mail-order as needed.   4. Spoke with Mr. Alberto and his wife, [facility] and medications are very affordable on new insurance plan since snf. Discussed anticipated costs through  Retail; however, cost would be higher and patient would be limited to 30 day fills. As a result, he is not a good candidate for  Retail Pharmacy Services at this time and would like to continue filling at [facility].   5. PharmD Follow Up: No further f/u warranted at this time     Damaris DumasD  10/12/2021  11:32 EDT

## 2021-12-29 ENCOUNTER — DOCUMENTATION (OUTPATIENT)
Dept: ENDOCRINOLOGY | Facility: CLINIC | Age: 61
End: 2021-12-29

## 2021-12-29 NOTE — PROGRESS NOTES
Patient declined filling specialty medication at [facility] and/or enrollment in the Patient Management Program.    [facility]  Ozempic- $25/ 30 days with coupon   Jardiance- $10/30 days with coupon   Patient recently retired in June 2022, medications very cheap on new plan    April Kavya Guillaume  Pharmacy Care Coordinator  12/29/2021  10:13 EST

## 2022-02-01 DIAGNOSIS — E11.65 UNCONTROLLED TYPE 2 DIABETES MELLITUS WITH HYPERGLYCEMIA: Primary | ICD-10-CM

## 2022-02-01 RX ORDER — BROMOCRIPTINE MESYLATE 0.8 MG/1
4 TABLET ORAL
Qty: 120 TABLET | Refills: 5 | Status: SHIPPED | OUTPATIENT
Start: 2022-02-01 | End: 2022-02-23 | Stop reason: SDUPTHER

## 2022-02-23 ENCOUNTER — OFFICE VISIT (OUTPATIENT)
Dept: ENDOCRINOLOGY | Facility: CLINIC | Age: 62
End: 2022-02-23

## 2022-02-23 VITALS
BODY MASS INDEX: 35.51 KG/M2 | HEIGHT: 64 IN | HEART RATE: 69 BPM | WEIGHT: 208 LBS | DIASTOLIC BLOOD PRESSURE: 77 MMHG | SYSTOLIC BLOOD PRESSURE: 126 MMHG | OXYGEN SATURATION: 97 %

## 2022-02-23 DIAGNOSIS — I10 BENIGN HYPERTENSION: ICD-10-CM

## 2022-02-23 DIAGNOSIS — E11.65 UNCONTROLLED TYPE 2 DIABETES MELLITUS WITH HYPERGLYCEMIA: Primary | ICD-10-CM

## 2022-02-23 DIAGNOSIS — E78.2 MIXED HYPERLIPIDEMIA: ICD-10-CM

## 2022-02-23 LAB
EXPIRATION DATE: ABNORMAL
EXPIRATION DATE: NORMAL
GLUCOSE BLDC GLUCOMTR-MCNC: 174 MG/DL (ref 70–130)
HBA1C MFR BLD: 7.5 %
Lab: ABNORMAL
Lab: NORMAL

## 2022-02-23 PROCEDURE — 99214 OFFICE O/P EST MOD 30 MIN: CPT | Performed by: INTERNAL MEDICINE

## 2022-02-23 PROCEDURE — 82947 ASSAY GLUCOSE BLOOD QUANT: CPT | Performed by: INTERNAL MEDICINE

## 2022-02-23 PROCEDURE — 83036 HEMOGLOBIN GLYCOSYLATED A1C: CPT | Performed by: INTERNAL MEDICINE

## 2022-02-23 RX ORDER — GLIMEPIRIDE 4 MG/1
4 TABLET ORAL 2 TIMES DAILY
Qty: 180 TABLET | Refills: 3 | Status: SHIPPED | OUTPATIENT
Start: 2022-02-23

## 2022-02-23 RX ORDER — BROMOCRIPTINE MESYLATE 0.8 MG/1
4 TABLET ORAL
Qty: 120 TABLET | Refills: 5 | Status: SHIPPED | OUTPATIENT
Start: 2022-02-23 | End: 2022-08-03 | Stop reason: SDUPTHER

## 2022-02-23 RX ORDER — PIOGLITAZONEHYDROCHLORIDE 45 MG/1
45 TABLET ORAL DAILY
Qty: 90 TABLET | Refills: 3 | Status: SHIPPED | OUTPATIENT
Start: 2022-02-23 | End: 2022-04-11

## 2022-02-23 RX ORDER — LISINOPRIL 40 MG/1
40 TABLET ORAL DAILY
Qty: 90 TABLET | Refills: 3 | Status: SHIPPED | OUTPATIENT
Start: 2022-02-23

## 2022-02-23 RX ORDER — ROSUVASTATIN CALCIUM 20 MG/1
20 TABLET, COATED ORAL NIGHTLY
Qty: 90 TABLET | Refills: 3 | Status: SHIPPED | OUTPATIENT
Start: 2022-02-23 | End: 2023-03-31

## 2022-02-23 RX ORDER — BLOOD SUGAR DIAGNOSTIC
STRIP MISCELLANEOUS
Qty: 100 EACH | Refills: 3 | Status: SHIPPED | OUTPATIENT
Start: 2022-02-23

## 2022-02-23 RX ORDER — SEMAGLUTIDE 1.34 MG/ML
1 INJECTION, SOLUTION SUBCUTANEOUS WEEKLY
Qty: 9 ML | Refills: 3 | Status: SHIPPED | OUTPATIENT
Start: 2022-02-23 | End: 2022-08-03

## 2022-02-23 NOTE — PROGRESS NOTES
"     Office Note      Date: 2022  Patient Name: Vu Alberto  MRN: [id number]  : 1960    Chief Complaint   Patient presents with   • Diabetes       History of Present Illness:   Vu Alberto is a 61 y.o. male who presents for Diabetes type 2. Diagnosed in: . Treated in past with oral agents. Current treatments: ozempic, glimepiride, metformin, jardiance, cycloset and pioglitazone. Number of insulin shots per day: none. Checks blood sugar every other day. Has low blood sugar: no. Aspirin use: Yes. Statin use: Yes. ACE-I/ARB use: Yes. Changes in health since last visit: none. Last eye exam .    Subjective      Diabetic Complications:  Eyes: No  Kidneys: No  Feet: No  Heart: No    Diet and Exercise:  Meals per day: 3  Minutes of exercise per week: 150 mins.    Review of Systems:   Review of Systems   Constitutional: Negative.    Cardiovascular: Negative.    Gastrointestinal: Negative.        The following portions of the patient's history were reviewed and updated as appropriate: allergies, current medications, past family history, past medical history, past social history, past surgical history and problem list.    Objective       Visit Vitals  /77   Pulse 69   Ht 162.6 cm (64\")   Wt 94.3 kg (208 lb)   SpO2 97%   BMI 35.70 kg/m²       Physical Exam:  Physical Exam  Constitutional:       Appearance: Normal appearance.   Neurological:      Mental Status: He is alert.         Labs:    HbA1c  Lab Results   Component Value Date    HGBA1C 7.5 2022       CMP  Lab Results   Component Value Date    GLUCOSE 96 2021    BUN 13 2021    CREATININE 0.60 (L) 2021    EGFRIFNONA 137 2021    EGFRIFAFRI >150 2021    BCR 21.7 2021    K 4.6 2021    CO2 26.1 2021    CALCIUM 10.0 2021    AST 23 2021    ALT 29 2021        Lipid Panel  Lab Results   Component Value Date    HDL 37 (L) 2021    LDL 53 2021    TRIG 202 (H) 2021    "     TSH  Lab Results   Component Value Date    TSH 1.630 05/21/2021        Hemoglobin A1C  Lab Results   Component Value Date    HGBA1C 7.5 02/23/2022        Microalbumin/Creatinine  Lab Results   Component Value Date    LAYLABCRERATIO 49.7 05/21/2021    MICROALBUR 1.5 05/21/2021           Assessment / Plan      Assessment & Plan:  Diagnoses and all orders for this visit:    1. Uncontrolled type 2 diabetes mellitus with hyperglycemia (HCC) (Primary)  Assessment & Plan:  Diabetes is improving with treatment.   Continue current treatment regimen.  Diabetes will be reassessed in 3 months.    Orders:  -     POC Glucose, Blood  -     POC Glycosylated Hemoglobin (Hb A1C)  -     metFORMIN (GLUCOPHAGE) 1000 MG tablet; Take 1 tablet by mouth 2 (Two) Times a Day.  Dispense: 180 tablet; Refill: 3    2. Benign hypertension  Assessment & Plan:  Hypertension is unchanged.  Continue current treatment regimen.  Blood pressure will be reassessed at the next regular appointment.      3. Mixed hyperlipidemia  Assessment & Plan:  Continue statin.      Other orders  -     Cycloset 0.8 MG tablet; Take 4 tablets by mouth Daily With Breakfast.  Dispense: 120 tablet; Refill: 5  -     empagliflozin (Jardiance) 25 MG tablet tablet; Take 1 tablet by mouth Daily.  Dispense: 90 tablet; Refill: 3  -     glimepiride (AMARYL) 4 MG tablet; Take 1 tablet by mouth 2 (Two) Times a Day.  Dispense: 180 tablet; Refill: 3  -     lisinopril (PRINIVIL,ZESTRIL) 40 MG tablet; Take 1 tablet by mouth Daily.  Dispense: 90 tablet; Refill: 3  -     pioglitazone (ACTOS) 45 MG tablet; Take 1 tablet by mouth Daily.  Dispense: 90 tablet; Refill: 3  -     rosuvastatin (CRESTOR) 20 MG tablet; Take 1 tablet by mouth Every Night.  Dispense: 90 tablet; Refill: 3  -     Semaglutide, 1 MG/DOSE, (Ozempic, 1 MG/DOSE,) 2 MG/1.5ML solution pen-injector; Inject 1 mg under the skin into the appropriate area as directed 1 (One) Time Per Week.  Dispense: 9 mL; Refill: 3  -     glucose  blood (OneTouch Verio) test strip; Test blood sugar daily.  Dispense: 100 each; Refill: 3      Return in about 3 months (around 5/23/2022) for Recheck with A1c, CMP, lipids, TSH, microalbumin, foot exam.    Adan Swain MD   02/23/2022

## 2022-03-22 RX ORDER — KETOCONAZOLE 20 MG/G
CREAM TOPICAL
Qty: 60 G | Refills: 0 | Status: SHIPPED | OUTPATIENT
Start: 2022-03-22

## 2022-03-22 NOTE — TELEPHONE ENCOUNTER
PATIENT'S SPOUSE CALLED REGARDING REQUESTED RX FOR FOOT CREAM. SPOUSE DOES NOT KNOW NAME OF MEDICATION. SHE STATES THAT PATIENT'S PHARMACY SENT A REQUEST YESTERDAY. SPOUSE STATES THAT PATIENT IS CURRENTLY OUT OF THIS MEDICATION. NO KIND OF CREAMS OR OINTMENT LISTED IN CURRENT MED LIST.

## 2022-04-11 RX ORDER — PIOGLITAZONEHYDROCHLORIDE 45 MG/1
45 TABLET ORAL DAILY
Qty: 90 TABLET | Refills: 3 | Status: SHIPPED | OUTPATIENT
Start: 2022-04-11

## 2022-07-07 ENCOUNTER — TELEPHONE (OUTPATIENT)
Dept: FAMILY MEDICINE CLINIC | Facility: CLINIC | Age: 62
End: 2022-07-07

## 2022-07-07 ENCOUNTER — OFFICE VISIT (OUTPATIENT)
Dept: FAMILY MEDICINE CLINIC | Facility: CLINIC | Age: 62
End: 2022-07-07

## 2022-07-07 VITALS
DIASTOLIC BLOOD PRESSURE: 62 MMHG | OXYGEN SATURATION: 98 % | SYSTOLIC BLOOD PRESSURE: 106 MMHG | HEART RATE: 87 BPM | HEIGHT: 64 IN | BODY MASS INDEX: 34.21 KG/M2 | RESPIRATION RATE: 18 BRPM | WEIGHT: 200.4 LBS

## 2022-07-07 DIAGNOSIS — J32.9 CHRONIC SINUSITIS, UNSPECIFIED LOCATION: Primary | ICD-10-CM

## 2022-07-07 PROCEDURE — 99213 OFFICE O/P EST LOW 20 MIN: CPT | Performed by: PHYSICIAN ASSISTANT

## 2022-07-07 RX ORDER — PREDNISONE 20 MG/1
TABLET ORAL
Qty: 9 TABLET | Refills: 0 | Status: SHIPPED | OUTPATIENT
Start: 2022-07-07 | End: 2023-02-14

## 2022-07-07 RX ORDER — AMOXICILLIN AND CLAVULANATE POTASSIUM 875; 125 MG/1; MG/1
1 TABLET, FILM COATED ORAL 2 TIMES DAILY
Qty: 14 TABLET | Refills: 0 | Status: SHIPPED | OUTPATIENT
Start: 2022-07-07 | End: 2022-07-14

## 2022-07-07 NOTE — PROGRESS NOTES
"Chief Complaint  running nose (States the nasal drainage stink really mad and makes them sick)    Subjective        Vu Alberto presents to [facility]  History of Present Illness  Patient states that he had a URI which got little bit worse so he went to see urgent care and was given antibiotics and a Medrol Dosepak.  He states that it seemed a bit better but he continues to have some postnasal drainage.  He noticed that the postnasal drainage is gotten worse and now his phlegm smells terrible to him.  He has a bit of a cough but no fever and no nausea vomiting or diarrhea.    Objective   Vital Signs:  /62   Pulse 87   Resp 18   Ht 162.6 cm (64.02\")   Wt 90.9 kg (200 lb 6.4 oz)   SpO2 98%   BMI 34.38 kg/m²   Estimated body mass index is 34.38 kg/m² as calculated from the following:    Height as of this encounter: 162.6 cm (64.02\").    Weight as of this encounter: 90.9 kg (200 lb 6.4 oz).          Physical Exam  Vitals reviewed.   Constitutional:       Appearance: Normal appearance.   HENT:      Head: Normocephalic and atraumatic.      Right Ear: Tympanic membrane, ear canal and external ear normal.      Left Ear: Tympanic membrane, ear canal and external ear normal.      Nose: Congestion present.      Mouth/Throat:      Mouth: Mucous membranes are moist.      Comments: Discolored postnasal drainage  Eyes:      Pupils: Pupils are equal, round, and reactive to light.   Cardiovascular:      Rate and Rhythm: Normal rate and regular rhythm.      Heart sounds: Normal heart sounds.   Pulmonary:      Effort: Pulmonary effort is normal.      Breath sounds: Normal breath sounds.   Neurological:      General: No focal deficit present.      Mental Status: He is alert.   Psychiatric:         Mood and Affect: Mood normal.        Result Review :                Assessment and Plan   Diagnoses and all orders for this visit:    1. Chronic sinusitis, unspecified location (Primary)  -     " amoxicillin-clavulanate (Augmentin) 875-125 MG per tablet; Take 1 tablet by mouth 2 (Two) Times a Day for 7 days.  Dispense: 14 tablet; Refill: 0  -     predniSONE (DELTASONE) 20 MG tablet; 2 tab po qd x 2 days then 1 tab po qd x 3 days  Dispense: 9 tablet; Refill: 0    We will treat patient for chronic sinusitis and will place him on Augmentin as well as prednisone.  We will have him back after this course to make sure that his symptoms have not persisted.         Follow Up   Return in about 3 weeks (around 7/28/2022) for Recheck chronic sinusitis.  Patient was given instructions and counseling regarding his condition or for health maintenance advice. Please see specific information pulled into the AVS if appropriate.

## 2022-08-03 ENCOUNTER — OFFICE VISIT (OUTPATIENT)
Dept: ENDOCRINOLOGY | Facility: CLINIC | Age: 62
End: 2022-08-03

## 2022-08-03 ENCOUNTER — LAB (OUTPATIENT)
Dept: LAB | Facility: HOSPITAL | Age: 62
End: 2022-08-03

## 2022-08-03 VITALS
BODY MASS INDEX: 34.15 KG/M2 | WEIGHT: 200 LBS | HEART RATE: 81 BPM | OXYGEN SATURATION: 99 % | DIASTOLIC BLOOD PRESSURE: 70 MMHG | SYSTOLIC BLOOD PRESSURE: 110 MMHG | HEIGHT: 64 IN

## 2022-08-03 DIAGNOSIS — I10 BENIGN HYPERTENSION: ICD-10-CM

## 2022-08-03 DIAGNOSIS — E11.65 UNCONTROLLED TYPE 2 DIABETES MELLITUS WITH HYPERGLYCEMIA: ICD-10-CM

## 2022-08-03 DIAGNOSIS — E78.2 MIXED HYPERLIPIDEMIA: ICD-10-CM

## 2022-08-03 DIAGNOSIS — E11.65 UNCONTROLLED TYPE 2 DIABETES MELLITUS WITH HYPERGLYCEMIA: Primary | ICD-10-CM

## 2022-08-03 LAB
ALBUMIN SERPL-MCNC: 4.3 G/DL (ref 3.5–5.2)
ALBUMIN UR-MCNC: 3.4 MG/DL
ALBUMIN/GLOB SERPL: 1.8 G/DL
ALP SERPL-CCNC: 87 U/L (ref 39–117)
ALT SERPL W P-5'-P-CCNC: 54 U/L (ref 1–41)
ANION GAP SERPL CALCULATED.3IONS-SCNC: 12.5 MMOL/L (ref 5–15)
AST SERPL-CCNC: 28 U/L (ref 1–40)
BILIRUB SERPL-MCNC: 0.2 MG/DL (ref 0–1.2)
BUN SERPL-MCNC: 11 MG/DL (ref 8–23)
BUN/CREAT SERPL: 16.7 (ref 7–25)
CALCIUM SPEC-SCNC: 9.1 MG/DL (ref 8.6–10.5)
CHLORIDE SERPL-SCNC: 98 MMOL/L (ref 98–107)
CHOLEST SERPL-MCNC: 145 MG/DL (ref 0–200)
CO2 SERPL-SCNC: 24.5 MMOL/L (ref 22–29)
CREAT SERPL-MCNC: 0.66 MG/DL (ref 0.76–1.27)
CREAT UR-MCNC: 65.1 MG/DL
EGFRCR SERPLBLD CKD-EPI 2021: 105.4 ML/MIN/1.73
EXPIRATION DATE: ABNORMAL
EXPIRATION DATE: NORMAL
GLOBULIN UR ELPH-MCNC: 2.4 GM/DL
GLUCOSE BLDC GLUCOMTR-MCNC: 159 MG/DL (ref 70–130)
GLUCOSE SERPL-MCNC: 144 MG/DL (ref 65–99)
HBA1C MFR BLD: 9.3 %
HDLC SERPL-MCNC: 31 MG/DL (ref 40–60)
LDLC SERPL CALC-MCNC: 44 MG/DL (ref 0–100)
LDLC/HDLC SERPL: 0.55 {RATIO}
Lab: ABNORMAL
Lab: NORMAL
MICROALBUMIN/CREAT UR: 52.2 MG/G
POTASSIUM SERPL-SCNC: 4.6 MMOL/L (ref 3.5–5.2)
PROT SERPL-MCNC: 6.7 G/DL (ref 6–8.5)
SODIUM SERPL-SCNC: 135 MMOL/L (ref 136–145)
TRIGL SERPL-MCNC: 484 MG/DL (ref 0–150)
TSH SERPL DL<=0.05 MIU/L-ACNC: 0.63 UIU/ML (ref 0.27–4.2)
VLDLC SERPL-MCNC: 70 MG/DL (ref 5–40)

## 2022-08-03 PROCEDURE — 82043 UR ALBUMIN QUANTITATIVE: CPT

## 2022-08-03 PROCEDURE — 80061 LIPID PANEL: CPT

## 2022-08-03 PROCEDURE — 80053 COMPREHEN METABOLIC PANEL: CPT

## 2022-08-03 PROCEDURE — 82947 ASSAY GLUCOSE BLOOD QUANT: CPT | Performed by: INTERNAL MEDICINE

## 2022-08-03 PROCEDURE — 83036 HEMOGLOBIN GLYCOSYLATED A1C: CPT | Performed by: INTERNAL MEDICINE

## 2022-08-03 PROCEDURE — 99214 OFFICE O/P EST MOD 30 MIN: CPT | Performed by: INTERNAL MEDICINE

## 2022-08-03 PROCEDURE — 84443 ASSAY THYROID STIM HORMONE: CPT

## 2022-08-03 PROCEDURE — 82570 ASSAY OF URINE CREATININE: CPT

## 2022-08-03 RX ORDER — BROMOCRIPTINE MESYLATE 0.8 MG/1
4 TABLET ORAL
Qty: 120 TABLET | Refills: 5 | Status: SHIPPED | OUTPATIENT
Start: 2022-08-03 | End: 2022-09-13 | Stop reason: SDUPTHER

## 2022-08-03 RX ORDER — SEMAGLUTIDE 1.34 MG/ML
1 INJECTION, SOLUTION SUBCUTANEOUS WEEKLY
COMMUNITY
Start: 2022-06-16 | End: 2022-08-03 | Stop reason: DRUGHIGH

## 2022-08-03 RX ORDER — SEMAGLUTIDE 2.68 MG/ML
2 INJECTION, SOLUTION SUBCUTANEOUS
Qty: 9 ML | Refills: 3 | Status: SHIPPED | OUTPATIENT
Start: 2022-08-03 | End: 2022-12-08 | Stop reason: SDUPTHER

## 2022-08-03 NOTE — PROGRESS NOTES
"     Office Note      Date: 2022  Patient Name: Vu Alberto  MRN: [id number]  : 1960    Chief Complaint   Patient presents with   • Diabetes       History of Present Illness:   Vu Alberto is a 62 y.o. male who presents for Diabetes type 2. Diagnosed in: . Treated in past with oral agents. Current treatments: ozempic, glimepiride, metformin, jardiance, cycloset and pioglitazone. Number of insulin shots per day: none. Checks blood sugar every other day. Has low blood sugar: no. Aspirin use: Yes. Statin use: Yes. ACE-I/ARB use: Yes. Changes in health since last visit: URI - was on steroids. Last eye exam .    Subjective      Diabetic Complications:  Eyes: No  Kidneys: No  Feet: No  Heart: No    Diet and Exercise:  Meals per day: 3  Minutes of exercise per week: 150 mins.    Review of Systems:   Review of Systems   Constitutional: Negative.    Cardiovascular: Negative.    Gastrointestinal: Negative.    Endocrine: Negative.        The following portions of the patient's history were reviewed and updated as appropriate: allergies, current medications, past family history, past medical history, past social history, past surgical history and problem list.    Objective       Visit Vitals  /70   Pulse 81   Ht 162.6 cm (64\")   Wt 90.7 kg (200 lb)   SpO2 99%   BMI 34.33 kg/m²       Physical Exam:  Physical Exam  Constitutional:       Appearance: Normal appearance.   Cardiovascular:      Pulses:           Dorsalis pedis pulses are 2+ on the right side and 2+ on the left side.        Posterior tibial pulses are 2+ on the right side and 2+ on the left side.   Feet:      Right foot:      Protective Sensation: 5 sites tested. 5 sites sensed.      Skin integrity: Skin integrity normal.      Toenail Condition: Right toenails are abnormally thick. Fungal disease present.     Left foot:      Protective Sensation: 5 sites tested. 5 sites sensed.      Skin integrity: Skin integrity normal.      Toenail " Condition: Left toenails are abnormally thick. Fungal disease present.  Neurological:      Mental Status: He is alert.         Labs:    HbA1c  Lab Results   Component Value Date    HGBA1C 8.4 12/21/2022       CMP  Lab Results   Component Value Date    GLUCOSE 144 (H) 08/03/2022    BUN 11 08/03/2022    CREATININE 0.66 (L) 08/03/2022    EGFRIFNONA 137 05/21/2021    EGFRIFAFRI >150 05/21/2021    BCR 16.7 08/03/2022    K 4.6 08/03/2022    CO2 24.5 08/03/2022    CALCIUM 9.1 08/03/2022    AST 28 08/03/2022    ALT 54 (H) 08/03/2022        Lipid Panel  Lab Results   Component Value Date    HDL 31 (L) 08/03/2022    LDL 44 08/03/2022    TRIG 484 (H) 08/03/2022        TSH  Lab Results   Component Value Date    TSH 0.626 08/03/2022        Hemoglobin A1C  Lab Results   Component Value Date    HGBA1C 8.4 12/21/2022        Microalbumin/Creatinine  Lab Results   Component Value Date    MALBCRERATIO 52.2 08/03/2022    MICROALBUR 3.4 08/03/2022           Assessment / Plan      Assessment & Plan:  Diagnoses and all orders for this visit:    1. Uncontrolled type 2 diabetes mellitus with hyperglycemia (HCC) (Primary)  Assessment & Plan:  Diabetes is worsening.  A1c above goal at 9.3% but was on steroids.  Continue current treatment regimen.  But increase ozempic.  Diabetes will be reassessed in 3 months.    Orders:  -     POC Glycosylated Hemoglobin (Hb A1C)  -     POC Glucose, Blood  -     Comprehensive Metabolic Panel; Future  -     Lipid Panel; Future  -     Microalbumin / Creatinine Urine Ratio - Urine, Clean Catch; Future  -     TSH; Future    2. Benign hypertension  Assessment & Plan:  Hypertension is unchanged.  Continue current treatment regimen.  Blood pressure will be reassessed at the next regular appointment.      3. Mixed hyperlipidemia  Assessment & Plan:  Continue statin.  Check lipids today.      Other orders  -     Discontinue: Semaglutide, 2 MG/DOSE, (Ozempic, 2 MG/DOSE,) 8 MG/3ML solution pen-injector; Inject 2 mg  under the skin into the appropriate area as directed Every 7 (Seven) Days.  Dispense: 9 mL; Refill: 3  -     Discontinue: Cycloset 0.8 MG tablet; Take 4 tablets by mouth Daily With Breakfast.  Dispense: 120 tablet; Refill: 5      Return in about 3 months (around 11/3/2022) for Recheck with A1c.    Adan Swain MD   08/03/2022

## 2022-08-04 ENCOUNTER — OFFICE VISIT (OUTPATIENT)
Dept: FAMILY MEDICINE CLINIC | Facility: CLINIC | Age: 62
End: 2022-08-04

## 2022-08-04 VITALS
HEART RATE: 74 BPM | WEIGHT: 199.7 LBS | BODY MASS INDEX: 34.09 KG/M2 | HEIGHT: 64 IN | SYSTOLIC BLOOD PRESSURE: 118 MMHG | OXYGEN SATURATION: 98 % | DIASTOLIC BLOOD PRESSURE: 62 MMHG | RESPIRATION RATE: 16 BRPM

## 2022-08-04 DIAGNOSIS — J32.9 CHRONIC SINUSITIS, UNSPECIFIED LOCATION: Primary | ICD-10-CM

## 2022-08-04 PROCEDURE — 99212 OFFICE O/P EST SF 10 MIN: CPT | Performed by: PHYSICIAN ASSISTANT

## 2022-08-04 NOTE — PROGRESS NOTES
"Chief Complaint  Sinusitis (Follow up, pt denies any ongoing sinus pressure, congestion, and drainage )    Subjective        Vu Alberto presents to [facility]  History of Present Illness  Patient was seen in the office about a month ago with a sinus infection.  He states that he finished his antibiotic course and has been doing much better since.  He states that he has had no return of his nasal congestion or postnasal drainage.    Objective   Vital Signs:  /62   Pulse 74   Resp 16   Ht 162.6 cm (64\")   Wt 90.6 kg (199 lb 11.2 oz)   SpO2 98%   BMI 34.28 kg/m²   Estimated body mass index is 34.28 kg/m² as calculated from the following:    Height as of this encounter: 162.6 cm (64\").    Weight as of this encounter: 90.6 kg (199 lb 11.2 oz).          Physical Exam  Vitals reviewed.   Constitutional:       Appearance: Normal appearance.   HENT:      Head: Normocephalic and atraumatic.      Right Ear: Tympanic membrane, ear canal and external ear normal.      Left Ear: Tympanic membrane, ear canal and external ear normal.      Nose: Nose normal.      Mouth/Throat:      Mouth: Mucous membranes are moist.   Eyes:      Pupils: Pupils are equal, round, and reactive to light.   Cardiovascular:      Rate and Rhythm: Normal rate and regular rhythm.      Heart sounds: Normal heart sounds.   Pulmonary:      Effort: Pulmonary effort is normal.      Breath sounds: Normal breath sounds.   Neurological:      Mental Status: He is alert.   Psychiatric:         Mood and Affect: Mood normal.        Result Review :                Assessment and Plan   Diagnoses and all orders for this visit:    1. Chronic sinusitis, unspecified location (Primary)         Resolved we will continue to follow if he has any issues he is to give our office a call.    Follow Up   No follow-ups on file.  Patient was given instructions and counseling regarding his condition or for health maintenance advice. Please see " specific information pulled into the AVS if appropriate.

## 2022-09-02 ENCOUNTER — TELEPHONE (OUTPATIENT)
Dept: ENDOCRINOLOGY | Facility: CLINIC | Age: 62
End: 2022-09-02

## 2022-09-02 NOTE — TELEPHONE ENCOUNTER
WIFE CALLED STATING THAT PATIENT'S PHARMACY INFORMED HER TO CALL TO INFORM THIS OFFICE THAT A PRIOR AUTH IS REQUIRED FOR PATIENT'S CYCLOSET. ADVISED SPOUSE THAT THE DENIAL FROM PHARMACY IS REQUIRED BEFORE A PRIOR AUTH CAN BE PROCESSED.

## 2022-09-08 ENCOUNTER — TELEPHONE (OUTPATIENT)
Dept: ENDOCRINOLOGY | Facility: CLINIC | Age: 62
End: 2022-09-08

## 2022-09-08 NOTE — TELEPHONE ENCOUNTER
Can we appeal?  He is already on metformin.  He shouldn't be on januvia or janumet because he is already on ozempic.

## 2022-09-08 NOTE — TELEPHONE ENCOUNTER
PRIYANK NIEVES Key: [id number] - PA Case ID: [id number] - Rx #: [id number] help? Call us at [phone]  Outcome  Deniedon September 7  Your PA request has been denied. Additional information will be provided in the denial communication. (Message 1140) Your PA request has been denied. Additional information will be provided in the denial communication. (Message 1145)  Drug  Cycloset 0.8MG tablets  Form  [employer] Electronic PA Form (2017 [employer])    Formulary alternative(s) are metformin, metformin extended-release (generic glucophage  XR), Janumet, Janumet XR, Januvia. Requirement: 3 in a class with 3 or more  alternatives

## 2022-09-09 ENCOUNTER — TELEPHONE (OUTPATIENT)
Dept: ENDOCRINOLOGY | Facility: CLINIC | Age: 62
End: 2022-09-09

## 2022-09-09 NOTE — TELEPHONE ENCOUNTER
PT'S WIFE CALLED STATING OZEMPIC IS ON BACKORDER. SHE STATED THIS PT IS DUE FOR INJECTION TODAY AND WOULD LIKE A CALL BACK TO CONSULT.

## 2022-09-13 RX ORDER — BROMOCRIPTINE MESYLATE 0.8 MG/1
4 TABLET ORAL
Qty: 120 TABLET | Refills: 5 | Status: SHIPPED | OUTPATIENT
Start: 2022-09-13 | End: 2022-12-21 | Stop reason: CLARIF

## 2022-09-19 ENCOUNTER — PRIOR AUTHORIZATION (OUTPATIENT)
Dept: ENDOCRINOLOGY | Facility: CLINIC | Age: 62
End: 2022-09-19

## 2022-09-19 NOTE — TELEPHONE ENCOUNTER
The appeal for Cycloset was denied. Has to try and fail all 3 formulary meds: metformin, Janumet and Januvia

## 2022-10-04 ENCOUNTER — TELEPHONE (OUTPATIENT)
Dept: ENDOCRINOLOGY | Facility: CLINIC | Age: 62
End: 2022-10-04

## 2022-10-04 NOTE — TELEPHONE ENCOUNTER
Pl;ease call [facility] [phone]  The cycloset 0.8 its coming up will not pay use generic and it tells the pharmacy to call us    Please call them back

## 2022-10-10 ENCOUNTER — TELEPHONE (OUTPATIENT)
Dept: ENDOCRINOLOGY | Facility: CLINIC | Age: 62
End: 2022-10-10

## 2022-10-10 NOTE — TELEPHONE ENCOUNTER
PT'S WIFE CALLED STATING HIS PHARM DOES NOT HAVE OZEMPIC IN STOCK SHE REQUESTED A SAMPLE AND CALL BACK.

## 2022-12-08 ENCOUNTER — TELEPHONE (OUTPATIENT)
Dept: ENDOCRINOLOGY | Facility: CLINIC | Age: 62
End: 2022-12-08

## 2022-12-08 RX ORDER — SEMAGLUTIDE 2.68 MG/ML
2 INJECTION, SOLUTION SUBCUTANEOUS
Qty: 9 ML | Refills: 0 | Status: SHIPPED | OUTPATIENT
Start: 2022-12-08

## 2022-12-08 NOTE — TELEPHONE ENCOUNTER
This patient's wife called to see if we have any samples of ozempic they have been having a hard time getting it for him. He will be out tomorrow.     Ozempic 8mg/3ml

## 2022-12-08 NOTE — TELEPHONE ENCOUNTER
PATIENT IS DUE TO TAKE OZEMPIC TOMORROW. HE IS COMPLETELY OUT OF MEDICATION AND NOT ABLE TO GET IT ANYWHERE. THEY NEED TO SEE IF WE CAN CALL IN ANOTHER TYPE OF MEDICATION SO HE CAN TAKE HIS MEDICATION TOMORROW AS USUAL. PHONE NUMBER [phone]

## 2022-12-08 NOTE — TELEPHONE ENCOUNTER
Per Paco, Ozempic Rep, [facility] [place] has it.  Spoke with [facility] and they are okay to go there an .  Rx sent per protocol.

## 2022-12-08 NOTE — TELEPHONE ENCOUNTER
Tried to call patient and there was no answer.  Unable to leave message due to voicemail not being set up.

## 2022-12-21 ENCOUNTER — OFFICE VISIT (OUTPATIENT)
Dept: ENDOCRINOLOGY | Facility: CLINIC | Age: 62
End: 2022-12-21

## 2022-12-21 VITALS
WEIGHT: 195 LBS | HEART RATE: 78 BPM | HEIGHT: 64 IN | DIASTOLIC BLOOD PRESSURE: 68 MMHG | OXYGEN SATURATION: 98 % | SYSTOLIC BLOOD PRESSURE: 106 MMHG | BODY MASS INDEX: 33.29 KG/M2

## 2022-12-21 DIAGNOSIS — E78.2 MIXED HYPERLIPIDEMIA: ICD-10-CM

## 2022-12-21 DIAGNOSIS — I10 BENIGN HYPERTENSION: ICD-10-CM

## 2022-12-21 DIAGNOSIS — E11.65 UNCONTROLLED TYPE 2 DIABETES MELLITUS WITH HYPERGLYCEMIA: Primary | ICD-10-CM

## 2022-12-21 LAB
EXPIRATION DATE: ABNORMAL
EXPIRATION DATE: NORMAL
GLUCOSE BLDC GLUCOMTR-MCNC: 142 MG/DL (ref 70–130)
HBA1C MFR BLD: 8.4 %
Lab: ABNORMAL
Lab: NORMAL

## 2022-12-21 PROCEDURE — 2033F EYE IMG VALID W/O RTNOPTHY: CPT | Performed by: INTERNAL MEDICINE

## 2022-12-21 PROCEDURE — 82947 ASSAY GLUCOSE BLOOD QUANT: CPT | Performed by: INTERNAL MEDICINE

## 2022-12-21 PROCEDURE — 92250 FUNDUS PHOTOGRAPHY W/I&R: CPT | Performed by: INTERNAL MEDICINE

## 2022-12-21 PROCEDURE — 99214 OFFICE O/P EST MOD 30 MIN: CPT | Performed by: INTERNAL MEDICINE

## 2022-12-21 PROCEDURE — 83036 HEMOGLOBIN GLYCOSYLATED A1C: CPT | Performed by: INTERNAL MEDICINE

## 2022-12-21 NOTE — ASSESSMENT & PLAN NOTE
Diabetes is improving with treatment. Recent FSBS have been at goal.  Continue current treatment regimen.  Diabetes will be reassessed in 3 months.    Retinal photos were performed today.  This revealed no retinopathy.

## 2022-12-21 NOTE — PROGRESS NOTES
"     Office Note      Date: 2022  Patient Name: Vu Alberto  MRN: [id number]  : 1960    Chief Complaint   Patient presents with   • Diabetes     Type II       History of Present Illness:   Vu Alberto is a 62 y.o. male who presents for Diabetes type 2. Diagnosed in: . Treated in past with oral agents. Current treatments: ozempic, glimepiride, metformin, jardiance, and pioglitazone. Number of insulin shots per day: none. Checks blood sugar every other day. Has low blood sugar: no. Aspirin use: Yes. Statin use: Yes. ACE-I/ARB use: Yes. Changes in health since last visit: steroid injection in back. Last eye exam .    His insurance denied paying for cycloset.    Subjective      Diabetic Complications:  Eyes: No  Kidneys: No  Feet: No  Heart: No    Diet and Exercise:  Meals per day: 3  Minutes of exercise per week: 150 mins.    Review of Systems:   Review of Systems   Constitutional: Negative.    Cardiovascular: Negative.    Gastrointestinal: Negative.    Endocrine: Negative.        The following portions of the patient's history were reviewed and updated as appropriate: allergies, current medications, past family history, past medical history, past social history, past surgical history and problem list.    Objective       Visit Vitals  /68 (BP Location: Left arm, Patient Position: Sitting, Cuff Size: Adult)   Pulse 78   Ht 162.6 cm (64\")   Wt 88.5 kg (195 lb)   SpO2 98%   BMI 33.47 kg/m²       Physical Exam:  Physical Exam  Constitutional:       Appearance: Normal appearance.   Neurological:      Mental Status: He is alert.         Labs:    HbA1c  Lab Results   Component Value Date    HGBA1C 8.4 2022       CMP  Lab Results   Component Value Date    GLUCOSE 144 (H) 2022    BUN 11 2022    CREATININE 0.66 (L) 2022    EGFRIFNONA 137 2021    EGFRIFAFRI >150 2021    BCR 16.7 2022    K 4.6 2022    CO2 24.5 2022    CALCIUM 9.1 2022    AST " 28 08/03/2022    ALT 54 (H) 08/03/2022        Lipid Panel  Lab Results   Component Value Date    HDL 31 (L) 08/03/2022    LDL 44 08/03/2022    TRIG 484 (H) 08/03/2022        TSH  Lab Results   Component Value Date    TSH 0.626 08/03/2022        Hemoglobin A1C  Lab Results   Component Value Date    HGBA1C 8.4 12/21/2022        Microalbumin/Creatinine  Lab Results   Component Value Date    MALBCRERATIO 52.2 08/03/2022    MICROALBUR 3.4 08/03/2022           Assessment / Plan      Assessment & Plan:  Diagnoses and all orders for this visit:    1. Uncontrolled type 2 diabetes mellitus with hyperglycemia (HCC) (Primary)  Assessment & Plan:  Diabetes is improving with treatment. Recent FSBS have been at goal.  Continue current treatment regimen.  Diabetes will be reassessed in 3 months.    Retinal photos were performed today.  This revealed no retinopathy.    Orders:  -     POC Glucose, Blood  -     POC Glycosylated Hemoglobin (Hb A1C)    2. Benign hypertension  Assessment & Plan:  Hypertension is unchanged.  Continue current treatment regimen.  Blood pressure will be reassessed at the next regular appointment.      3. Mixed hyperlipidemia  Assessment & Plan:  Continue statin.      Current Outpatient Medications   Medication Instructions   • Aspirin Buf,CaCarb-MgCarb-MgO, 81 MG tablet 1 tablet, Daily   • Blood Glucose Monitoring Suppl (OneTouch Verio) w/Device kit 1 Device, Does not apply, See Admin Instructions, Use to check blood sugar daily.   • empagliflozin (JARDIANCE) 25 mg, Oral, Daily   • glimepiride (AMARYL) 4 mg, Oral, 2 Times Daily   • glucose blood (OneTouch Verio) test strip Test blood sugar daily.   • ketoconazole (NIZORAL) 2 % cream Use as directed daily on feet   • Lancets (onetouch ultrasoft) lancets Use to test blood sugar daily.   • lisinopril (PRINIVIL,ZESTRIL) 40 mg, Oral, Daily   • metFORMIN (GLUCOPHAGE) 1,000 mg, Oral, 2 Times Daily   • Ozempic (2 MG/DOSE) 2 mg, Subcutaneous, Every 7 Days   •  pioglitazone (ACTOS) 45 mg, Oral, Daily   • predniSONE (DELTASONE) 20 MG tablet 2 tab po qd x 2 days then 1 tab po qd x 3 days   • rosuvastatin (CRESTOR) 20 mg, Oral, Nightly      Return in about 3 months (around 3/21/2023) for Recheck with A1c.    Adan Swain MD   12/21/2022

## 2022-12-28 ENCOUNTER — TELEPHONE (OUTPATIENT)
Dept: ENDOCRINOLOGY | Facility: CLINIC | Age: 62
End: 2022-12-28

## 2022-12-28 NOTE — TELEPHONE ENCOUNTER
Received report from ophthalmologist that the retinal photos showed no retinopathy.  Left voice mail on pt's phone about results.

## 2023-02-14 ENCOUNTER — OFFICE VISIT (OUTPATIENT)
Dept: FAMILY MEDICINE CLINIC | Facility: CLINIC | Age: 63
End: 2023-02-14
Payer: COMMERCIAL

## 2023-02-14 VITALS
SYSTOLIC BLOOD PRESSURE: 116 MMHG | OXYGEN SATURATION: 95 % | DIASTOLIC BLOOD PRESSURE: 64 MMHG | BODY MASS INDEX: 33.97 KG/M2 | WEIGHT: 199 LBS | HEIGHT: 64 IN | HEART RATE: 96 BPM | TEMPERATURE: 98.2 F

## 2023-02-14 DIAGNOSIS — J01.00 ACUTE NON-RECURRENT MAXILLARY SINUSITIS: Primary | ICD-10-CM

## 2023-02-14 PROCEDURE — 99213 OFFICE O/P EST LOW 20 MIN: CPT | Performed by: PHYSICIAN ASSISTANT

## 2023-02-14 RX ORDER — CEFDINIR 300 MG/1
300 CAPSULE ORAL 2 TIMES DAILY
Qty: 20 CAPSULE | Refills: 0 | Status: SHIPPED | OUTPATIENT
Start: 2023-02-14

## 2023-02-14 NOTE — PROGRESS NOTES
"Chief Complaint  Cough (Coughing and nasal congestion started Friday )    Subjective          Vu Alberto presents to [facility]  History of Present Illness  Patient in today for evaluation on nasal congestion, sinus pressure and foul smelling nasal discharge. Has had minimal cough. No vomiting or diarrhea. Denies fever.   Cough  Associated symptoms include nasal congestion. Pertinent negatives include no chest pain, fever, sore throat, shortness of breath or wheezing.       Objective   Vital Signs:   /64 (BP Location: Left arm, Patient Position: Sitting, Cuff Size: Large Adult)   Pulse 96   Temp 98.2 °F (36.8 °C)   Ht 162.6 cm (64\")   Wt 90.3 kg (199 lb)   SpO2 95%   BMI 34.16 kg/m²     Body mass index is 34.16 kg/m².    Review of Systems   Constitutional: Negative for fever.   HENT: Positive for congestion. Negative for sore throat.    Respiratory: Positive for cough. Negative for shortness of breath and wheezing.    Cardiovascular: Negative for chest pain.   Gastrointestinal: Negative for abdominal pain, diarrhea, nausea and vomiting.   Neurological: Negative for dizziness and headache.       Past History:  Medical History: has a past medical history of Benign essential hypertension, Mixed hyperlipidemia, Obesity, and Type 2 diabetes mellitus, uncontrolled.   Surgical History: has a past surgical history that includes No past surgeries.   Family History: family history includes Kidney disease in his sister.   Social History: reports that he quit smoking about 9 years ago. His smoking use included cigarettes. He has a 45.00 pack-year smoking history. He has never used smokeless tobacco. He reports current alcohol use. He reports that he does not use drugs.      Current Outpatient Medications:   •  Aspirin Buf,CaCarb-MgCarb-MgO, 81 MG tablet, 1 tablet Daily., Disp: , Rfl:   •  Blood Glucose Monitoring Suppl (OneTouch Verio) w/Device kit, 1 Device See Admin Instructions. " Use to check blood sugar daily., Disp: 1 kit, Rfl: 0  •  empagliflozin (Jardiance) 25 MG tablet tablet, Take 1 tablet by mouth Daily., Disp: 90 tablet, Rfl: 3  •  glimepiride (AMARYL) 4 MG tablet, Take 1 tablet by mouth 2 (Two) Times a Day., Disp: 180 tablet, Rfl: 3  •  glucose blood (OneTouch Verio) test strip, Test blood sugar daily., Disp: 100 each, Rfl: 3  •  ketoconazole (NIZORAL) 2 % cream, Use as directed daily on feet, Disp: 60 g, Rfl: 0  •  Lancets (onetouch ultrasoft) lancets, Use to test blood sugar daily., Disp: 100 each, Rfl: 3  •  lisinopril (PRINIVIL,ZESTRIL) 40 MG tablet, Take 1 tablet by mouth Daily., Disp: 90 tablet, Rfl: 3  •  metFORMIN (GLUCOPHAGE) 1000 MG tablet, Take 1 tablet by mouth 2 (Two) Times a Day., Disp: 180 tablet, Rfl: 3  •  pioglitazone (ACTOS) 45 MG tablet, TAKE 1 TABLET BY MOUTH DAILY, Disp: 90 tablet, Rfl: 3  •  rosuvastatin (CRESTOR) 20 MG tablet, Take 1 tablet by mouth Every Night., Disp: 90 tablet, Rfl: 3  •  Semaglutide, 2 MG/DOSE, (Ozempic, 2 MG/DOSE,) 8 MG/3ML solution pen-injector, Inject 2 mg under the skin into the appropriate area as directed Every 7 (Seven) Days., Disp: 9 mL, Rfl: 0  •  cefdinir (OMNICEF) 300 MG capsule, Take 1 capsule by mouth 2 (Two) Times a Day., Disp: 20 capsule, Rfl: 0  Allergies: Patient has no known allergies.    Physical Exam  Constitutional:       Appearance: Normal appearance.   HENT:      Head:      Comments: Mild frontal sinus tenderness bilaterally     Right Ear: Tympanic membrane normal.      Left Ear: Tympanic membrane normal.      Nose: Nose normal.      Mouth/Throat:      Mouth: Mucous membranes are moist.   Eyes:      Pupils: Pupils are equal, round, and reactive to light.   Cardiovascular:      Rate and Rhythm: Normal rate and regular rhythm.      Heart sounds: Normal heart sounds.   Neurological:      Mental Status: He is alert.             Assessment and Plan   Diagnoses and all orders for this visit:    1. Acute non-recurrent  maxillary sinusitis (Primary)  Will start on antibiotic and may try some nasal saline. If not improving, recommend to get in with ENT for evaluation.   Other orders  -     cefdinir (OMNICEF) 300 MG capsule; Take 1 capsule by mouth 2 (Two) Times a Day.  Dispense: 20 capsule; Refill: 0            Follow Up   No follow-ups on file.  Patient was given instructions and counseling regarding his condition or for health maintenance advice. Please see specific information pulled into the AVS if appropriate.     Qian Boo PA-C

## 2023-03-06 RX ORDER — EMPAGLIFLOZIN 25 MG/1
TABLET, FILM COATED ORAL
Qty: 90 TABLET | Refills: 1 | Status: SHIPPED | OUTPATIENT
Start: 2023-03-06

## 2023-03-31 RX ORDER — ROSUVASTATIN CALCIUM 20 MG/1
20 TABLET, COATED ORAL NIGHTLY
Qty: 90 TABLET | Refills: 3 | Status: SHIPPED | OUTPATIENT
Start: 2023-03-31

## 2023-04-20 ENCOUNTER — OFFICE VISIT (OUTPATIENT)
Dept: ENDOCRINOLOGY | Facility: CLINIC | Age: 63
End: 2023-04-20
Payer: COMMERCIAL

## 2023-04-20 VITALS
HEART RATE: 88 BPM | DIASTOLIC BLOOD PRESSURE: 58 MMHG | RESPIRATION RATE: 15 BRPM | SYSTOLIC BLOOD PRESSURE: 120 MMHG | OXYGEN SATURATION: 98 % | BODY MASS INDEX: 35.13 KG/M2 | WEIGHT: 205.8 LBS | HEIGHT: 64 IN

## 2023-04-20 DIAGNOSIS — E11.65 UNCONTROLLED TYPE 2 DIABETES MELLITUS WITH HYPERGLYCEMIA: Primary | ICD-10-CM

## 2023-04-20 DIAGNOSIS — E78.2 MIXED HYPERLIPIDEMIA: ICD-10-CM

## 2023-04-20 DIAGNOSIS — I10 BENIGN HYPERTENSION: ICD-10-CM

## 2023-04-20 LAB
EXPIRATION DATE: NORMAL
GLUCOSE BLDC GLUCOMTR-MCNC: 225 MG/DL (ref 70–130)
HBA1C MFR BLD: 7.5 %
Lab: NORMAL

## 2023-04-20 RX ORDER — ROSUVASTATIN CALCIUM 20 MG/1
20 TABLET, COATED ORAL NIGHTLY
Qty: 90 TABLET | Refills: 3 | Status: SHIPPED | OUTPATIENT
Start: 2023-04-20

## 2023-04-20 RX ORDER — GLIMEPIRIDE 4 MG/1
4 TABLET ORAL 2 TIMES DAILY
Qty: 180 TABLET | Refills: 3 | Status: SHIPPED | OUTPATIENT
Start: 2023-04-20

## 2023-04-20 RX ORDER — PIOGLITAZONEHYDROCHLORIDE 45 MG/1
45 TABLET ORAL DAILY
Qty: 90 TABLET | Refills: 3 | Status: SHIPPED | OUTPATIENT
Start: 2023-04-20

## 2023-04-20 RX ORDER — SEMAGLUTIDE 2.68 MG/ML
2 INJECTION, SOLUTION SUBCUTANEOUS
Qty: 9 ML | Refills: 0 | Status: SHIPPED | OUTPATIENT
Start: 2023-04-20

## 2023-04-20 RX ORDER — LISINOPRIL 40 MG/1
40 TABLET ORAL DAILY
Qty: 90 TABLET | Refills: 3 | Status: SHIPPED | OUTPATIENT
Start: 2023-04-20

## 2023-04-20 NOTE — ASSESSMENT & PLAN NOTE
Diabetes is improving with treatment.  He has been taking his meds more consistently now that he has retired.  Continue current treatment regimen.  Diabetes will be reassessed in 3 months.

## 2023-04-20 NOTE — PROGRESS NOTES
"     Office Note      Date: 2023  Patient Name: Vu Alberto  MRN: [id number]  : 1960    Chief Complaint   Patient presents with   • Follow-up     Uncontrolled type 2 diabetes mellitus with hyperglycemia       History of Present Illness:   Vu Alberto is a 63 y.o. male who presents for Diabetes type 2. Diagnosed in: . Treated in past with oral agents. Current treatments: ozempic, glimepiride, metformin, jardiance, and pioglitazone. Number of insulin shots per day: none. Checks blood sugar every other day. Has low blood sugar: no. Aspirin use: Yes. Statin use: Yes. ACE-I/ARB use: Yes. Changes in health since last visit: none. Last eye exam 2022.    Subjective      Diabetic Complications:  Eyes: No  Kidneys: No  Feet: No  Heart: No    Diet and Exercise:  Meals per day: 3  Minutes of exercise per week: 150 mins.    Review of Systems:   Review of Systems   Constitutional: Negative.    Cardiovascular: Negative.    Gastrointestinal: Negative.    Endocrine: Negative.        The following portions of the patient's history were reviewed and updated as appropriate: allergies, current medications, past family history, past medical history, past social history, past surgical history and problem list.    Objective       Visit Vitals  /58 (BP Location: Right arm, Patient Position: Sitting, Cuff Size: Adult)   Pulse 88   Resp 15   Ht 162.6 cm (64.02\")   Wt 93.4 kg (205 lb 12.8 oz)   SpO2 98%   BMI 35.31 kg/m²       Physical Exam:  Physical Exam  Constitutional:       Appearance: Normal appearance.   Neurological:      Mental Status: He is alert.         Labs:    HbA1c  Lab Results   Component Value Date    HGBA1C 7.5 2023       CMP  Lab Results   Component Value Date    GLUCOSE 144 (H) 2022    BUN 11 2022    CREATININE 0.66 (L) 2022    EGFRIFNONA 137 2021    EGFRIFAFRI >150 2021    BCR 16.7 2022    K 4.6 2022    CO2 24.5 2022    CALCIUM 9.1 " 08/03/2022    AST 28 08/03/2022    ALT 54 (H) 08/03/2022        Lipid Panel  Lab Results   Component Value Date    HDL 31 (L) 08/03/2022    LDL 44 08/03/2022    TRIG 484 (H) 08/03/2022        TSH  Lab Results   Component Value Date    TSH 0.626 08/03/2022        Hemoglobin A1C  Lab Results   Component Value Date    HGBA1C 7.5 04/20/2023        Microalbumin/Creatinine  Lab Results   Component Value Date    MALBCRERATIO 52.2 08/03/2022    MICROALBUR 3.4 08/03/2022           Assessment / Plan      Assessment & Plan:  Diagnoses and all orders for this visit:    1. Uncontrolled type 2 diabetes mellitus with hyperglycemia (Primary)  Assessment & Plan:  Diabetes is improving with treatment.  He has been taking his meds more consistently now that he has retired.  Continue current treatment regimen.  Diabetes will be reassessed in 3 months.    Orders:  -     POC Glycosylated Hemoglobin (Hb A1C)  -     POC Glucose Fingerstick  -     metFORMIN (GLUCOPHAGE) 1000 MG tablet; Take 1 tablet by mouth 2 (Two) Times a Day.  Dispense: 180 tablet; Refill: 3    2. Benign hypertension  Assessment & Plan:  Hypertension is unchanged.  Continue current treatment regimen.  Blood pressure will be reassessed at the next regular appointment.      3. Mixed hyperlipidemia  Assessment & Plan:  Continue statin.  Plan to check lipids next visit.      Other orders  -     glimepiride (AMARYL) 4 MG tablet; Take 1 tablet by mouth 2 (Two) Times a Day.  Dispense: 180 tablet; Refill: 3  -     empagliflozin (Jardiance) 25 MG tablet tablet; Take 1 tablet by mouth Daily.  Dispense: 90 tablet; Refill: 3  -     lisinopril (PRINIVIL,ZESTRIL) 40 MG tablet; Take 1 tablet by mouth Daily.  Dispense: 90 tablet; Refill: 3  -     pioglitazone (ACTOS) 45 MG tablet; Take 1 tablet by mouth Daily.  Dispense: 90 tablet; Refill: 3  -     rosuvastatin (CRESTOR) 20 MG tablet; Take 1 tablet by mouth Every Night.  Dispense: 90 tablet; Refill: 3  -     Semaglutide, 2 MG/DOSE,  (Ozempic, 2 MG/DOSE,) 8 MG/3ML solution pen-injector; Inject 2 mg under the skin into the appropriate area as directed Every 7 (Seven) Days.  Dispense: 9 mL; Refill: 0    Current Outpatient Medications   Medication Instructions   • Aspirin Buf,CaCarb-MgCarb-MgO, 81 MG tablet 1 tablet, Daily   • Blood Glucose Monitoring Suppl (OneTouch Verio) w/Device kit 1 Device, Does not apply, See Admin Instructions, Use to check blood sugar daily.   • cefdinir (OMNICEF) 300 mg, Oral, 2 Times Daily   • empagliflozin (JARDIANCE) 25 mg, Oral, Daily   • glimepiride (AMARYL) 4 mg, Oral, 2 Times Daily   • glucose blood (OneTouch Verio) test strip USE TO TEST BLOOD SUGAR DAILY   • ketoconazole (NIZORAL) 2 % cream Use as directed daily on feet   • Lancets (onetouch ultrasoft) lancets Use to test blood sugar daily.   • lisinopril (PRINIVIL,ZESTRIL) 40 mg, Oral, Daily   • metFORMIN (GLUCOPHAGE) 1,000 mg, Oral, 2 Times Daily   • Ozempic (2 MG/DOSE) 2 mg, Subcutaneous, Every 7 Days   • pioglitazone (ACTOS) 45 mg, Oral, Daily   • rosuvastatin (CRESTOR) 20 mg, Oral, Nightly      Return in about 3 months (around 7/20/2023) for Recheck with A1c, CMP, lipid, TSH, microalbumin, foot exam.    Adan Swain MD   04/20/2023

## 2023-08-11 RX ORDER — SEMAGLUTIDE 2.68 MG/ML
INJECTION, SOLUTION SUBCUTANEOUS
Qty: 9 ML | Refills: 0 | Status: SHIPPED | OUTPATIENT
Start: 2023-08-11

## 2023-08-11 NOTE — TELEPHONE ENCOUNTER
Rx Refill Note  Requested Prescriptions     Pending Prescriptions Disp Refills    Ozempic, 2 MG/DOSE, 8 MG/3ML solution pen-injector [Pharmacy Med Name: OZEMPIC 2MG PER DOSE (1X8MG PEN)] 9 mL 0     Sig: INJECT 2MG UNDER THE SKIN INTO THE APPROPRIATE AREA EVERY 7 DAYS AS DIRECTED    Dx Code:  E11.65  Last office visit with prescribing clinician: 4/20/2023   Last telemedicine visit with prescribing clinician: Visit date not found   Next office visit with prescribing clinician: 9/14/2023                         Would you like a call back once the refill request has been completed: [] Yes [] No    If the office needs to give you a call back, can they leave a voicemail: [] Yes [] No    Shantal Zepeda MA  08/11/23, 10:35 EDT

## 2023-09-14 ENCOUNTER — OFFICE VISIT (OUTPATIENT)
Dept: ENDOCRINOLOGY | Facility: CLINIC | Age: 63
End: 2023-09-14
Payer: COMMERCIAL

## 2023-09-14 ENCOUNTER — DOCUMENTATION (OUTPATIENT)
Dept: ENDOCRINOLOGY | Facility: CLINIC | Age: 63
End: 2023-09-14

## 2023-09-14 VITALS
HEART RATE: 76 BPM | SYSTOLIC BLOOD PRESSURE: 112 MMHG | BODY MASS INDEX: 33.97 KG/M2 | HEIGHT: 64 IN | WEIGHT: 199 LBS | DIASTOLIC BLOOD PRESSURE: 70 MMHG | OXYGEN SATURATION: 96 %

## 2023-09-14 DIAGNOSIS — E78.2 MIXED HYPERLIPIDEMIA: ICD-10-CM

## 2023-09-14 DIAGNOSIS — I10 BENIGN HYPERTENSION: ICD-10-CM

## 2023-09-14 DIAGNOSIS — E11.65 UNCONTROLLED TYPE 2 DIABETES MELLITUS WITH HYPERGLYCEMIA: Primary | ICD-10-CM

## 2023-09-14 LAB
ALBUMIN SERPL-MCNC: 4.5 G/DL (ref 3.5–5.2)
ALBUMIN/GLOB SERPL: 1.5 G/DL
ALP SERPL-CCNC: 99 U/L (ref 39–117)
ALT SERPL W P-5'-P-CCNC: 33 U/L (ref 1–41)
ANION GAP SERPL CALCULATED.3IONS-SCNC: 13 MMOL/L (ref 5–15)
AST SERPL-CCNC: 29 U/L (ref 1–40)
BILIRUB SERPL-MCNC: 0.2 MG/DL (ref 0–1.2)
BUN SERPL-MCNC: 11 MG/DL (ref 8–23)
BUN/CREAT SERPL: 15.1 (ref 7–25)
CALCIUM SPEC-SCNC: 10.2 MG/DL (ref 8.6–10.5)
CHLORIDE SERPL-SCNC: 98 MMOL/L (ref 98–107)
CHOLEST SERPL-MCNC: 100 MG/DL (ref 0–200)
CO2 SERPL-SCNC: 25 MMOL/L (ref 22–29)
CREAT SERPL-MCNC: 0.73 MG/DL (ref 0.76–1.27)
EGFRCR SERPLBLD CKD-EPI 2021: 101.6 ML/MIN/1.73
EXPIRATION DATE: NORMAL
EXPIRATION DATE: NORMAL
GLOBULIN UR ELPH-MCNC: 3 GM/DL
GLUCOSE BLDC GLUCOMTR-MCNC: 127 MG/DL (ref 70–130)
GLUCOSE SERPL-MCNC: 108 MG/DL (ref 65–99)
HBA1C MFR BLD: 8.7 %
HDLC SERPL-MCNC: 34 MG/DL (ref 40–60)
LDLC SERPL CALC-MCNC: 36 MG/DL (ref 0–100)
LDLC/HDLC SERPL: 0.88 {RATIO}
Lab: NORMAL
Lab: NORMAL
POTASSIUM SERPL-SCNC: 5.4 MMOL/L (ref 3.5–5.2)
PROT SERPL-MCNC: 7.5 G/DL (ref 6–8.5)
SODIUM SERPL-SCNC: 136 MMOL/L (ref 136–145)
TRIGL SERPL-MCNC: 180 MG/DL (ref 0–150)
TSH SERPL DL<=0.05 MIU/L-ACNC: 1.54 UIU/ML (ref 0.27–4.2)
VLDLC SERPL-MCNC: 30 MG/DL (ref 5–40)

## 2023-09-14 PROCEDURE — 80061 LIPID PANEL: CPT | Performed by: INTERNAL MEDICINE

## 2023-09-14 PROCEDURE — 84443 ASSAY THYROID STIM HORMONE: CPT | Performed by: INTERNAL MEDICINE

## 2023-09-14 PROCEDURE — 80053 COMPREHEN METABOLIC PANEL: CPT | Performed by: INTERNAL MEDICINE

## 2023-09-14 PROCEDURE — 82570 ASSAY OF URINE CREATININE: CPT | Performed by: INTERNAL MEDICINE

## 2023-09-14 PROCEDURE — 99214 OFFICE O/P EST MOD 30 MIN: CPT | Performed by: INTERNAL MEDICINE

## 2023-09-14 PROCEDURE — 82043 UR ALBUMIN QUANTITATIVE: CPT | Performed by: INTERNAL MEDICINE

## 2023-09-14 PROCEDURE — 83036 HEMOGLOBIN GLYCOSYLATED A1C: CPT | Performed by: INTERNAL MEDICINE

## 2023-09-14 PROCEDURE — 82947 ASSAY GLUCOSE BLOOD QUANT: CPT | Performed by: INTERNAL MEDICINE

## 2023-09-14 RX ORDER — PIOGLITAZONEHYDROCHLORIDE 45 MG/1
45 TABLET ORAL DAILY
Qty: 90 TABLET | Refills: 3 | Status: SHIPPED | OUTPATIENT
Start: 2023-09-14

## 2023-09-14 RX ORDER — SEMAGLUTIDE 2.68 MG/ML
2 INJECTION, SOLUTION SUBCUTANEOUS WEEKLY
Qty: 9 ML | Refills: 3 | Status: SHIPPED | OUTPATIENT
Start: 2023-09-14

## 2023-09-14 RX ORDER — LISINOPRIL 40 MG/1
40 TABLET ORAL DAILY
Qty: 90 TABLET | Refills: 3 | Status: SHIPPED | OUTPATIENT
Start: 2023-09-14

## 2023-09-14 RX ORDER — ROSUVASTATIN CALCIUM 20 MG/1
20 TABLET, COATED ORAL NIGHTLY
Qty: 90 TABLET | Refills: 3 | Status: SHIPPED | OUTPATIENT
Start: 2023-09-14

## 2023-09-14 RX ORDER — GLIMEPIRIDE 4 MG/1
4 TABLET ORAL 2 TIMES DAILY
Qty: 180 TABLET | Refills: 3 | Status: SHIPPED | OUTPATIENT
Start: 2023-09-14

## 2023-09-14 RX ORDER — BLOOD SUGAR DIAGNOSTIC
STRIP MISCELLANEOUS
Qty: 100 EACH | Refills: 3 | Status: SHIPPED | OUTPATIENT
Start: 2023-09-14

## 2023-09-14 NOTE — PROGRESS NOTES
"     Office Note      Date: 2023  Patient Name: Vu Alberto  MRN: [id number]  : 1960    Chief Complaint   Patient presents with    Diabetes       History of Present Illness:   Vu Alberto is a 63 y.o. male who presents for Diabetes type 2. Diagnosed in: . Treated in past with oral agents. Current treatments: ozempic, glimepiride, metformin, jardiance, and pioglitazone. Number of insulin shots per day: none. Checks blood sugar every other day. Has low blood sugar: no. Aspirin use: Yes. Statin use: Yes. ACE-I/ARB use: Yes. Changes in health since last visit: none. Last eye exam 2022.     He has been wearing a loaner DexCom.  He has noted some postprandial spikes.    Subjective      Diabetic Complications:  Eyes: No  Kidneys: No  Feet: No  Heart: No    Diet and Exercise:  Meals per day: 3  Minutes of exercise per week: 150 mins.    Review of Systems:   Review of Systems   Constitutional: Negative.    Cardiovascular: Negative.    Gastrointestinal: Negative.    Endocrine: Negative.      The following portions of the patient's history were reviewed and updated as appropriate: allergies, current medications, past family history, past medical history, past social history, past surgical history, and problem list.    Objective     Visit Vitals  /70   Pulse 76   Ht 162.6 cm (64\")   Wt 90.3 kg (199 lb)   SpO2 96%   BMI 34.16 kg/m²       Physical Exam:  Physical Exam  Constitutional:       Appearance: Normal appearance.   Cardiovascular:      Pulses:           Dorsalis pedis pulses are 2+ on the right side and 2+ on the left side.        Posterior tibial pulses are 2+ on the right side and 2+ on the left side.   Feet:      Right foot:      Protective Sensation: 5 sites tested.  5 sites sensed.      Skin integrity: Skin integrity normal.      Toenail Condition: Right toenails are abnormally thick. Fungal disease present.     Left foot:      Protective Sensation: 5 sites tested.  5 sites sensed.      " Skin integrity: Skin integrity normal.      Toenail Condition: Left toenails are abnormally thick. Fungal disease present.  Neurological:      Mental Status: He is alert.       Labs:    HbA1c  Lab Results   Component Value Date    HGBA1C 8.7 09/14/2023       CMP  Lab Results   Component Value Date    GLUCOSE 144 (H) 08/03/2022    BUN 11 08/03/2022    CREATININE 0.66 (L) 08/03/2022    EGFRIFNONA 137 05/21/2021    EGFRIFAFRI >150 05/21/2021    BCR 16.7 08/03/2022    K 4.6 08/03/2022    CO2 24.5 08/03/2022    CALCIUM 9.1 08/03/2022    AST 28 08/03/2022    ALT 54 (H) 08/03/2022        Lipid Panel  Lab Results   Component Value Date    HDL 31 (L) 08/03/2022    LDL 44 08/03/2022    TRIG 484 (H) 08/03/2022        TSH  Lab Results   Component Value Date    TSH 0.626 08/03/2022        Hemoglobin A1C  Lab Results   Component Value Date    HGBA1C 8.7 09/14/2023        Microalbumin/Creatinine  Lab Results   Component Value Date    MALBCRERATIO 52.2 08/03/2022    MICROALBUR 3.4 08/03/2022           Assessment / Plan      Assessment & Plan:  Diagnoses and all orders for this visit:    1. Uncontrolled type 2 diabetes mellitus with hyperglycemia (Primary)  Assessment & Plan:  Diabetes is worsening.   We discussed treatment options.   He is on max meds without starting insulin.  He is reluctant to start insulin.  We discussed limiting mealtime carbs to around 30g with each meal.  Diabetes will be reassessed in 3 months.    Orders:  -     POC Glucose, Blood  -     POC Glycosylated Hemoglobin (Hb A1C)  -     Comprehensive Metabolic Panel; Future  -     Lipid Panel; Future  -     Microalbumin / Creatinine Urine Ratio - Urine, Clean Catch; Future  -     TSH; Future  -     metFORMIN (GLUCOPHAGE) 1000 MG tablet; Take 1 tablet by mouth 2 (Two) Times a Day.  Dispense: 180 tablet; Refill: 3    2. Benign hypertension  Assessment & Plan:  Hypertension is unchanged.  Continue current treatment regimen.  Blood pressure will be reassessed at the  next regular appointment.      3. Mixed hyperlipidemia  Assessment & Plan:  Continue statin.  Check nonfasting lipids today.      Other orders  -     empagliflozin (Jardiance) 25 MG tablet tablet; Take 1 tablet by mouth Daily.  Dispense: 90 tablet; Refill: 3  -     glimepiride (AMARYL) 4 MG tablet; Take 1 tablet by mouth 2 (Two) Times a Day.  Dispense: 180 tablet; Refill: 3  -     glucose blood (OneTouch Verio) test strip; USE TO TEST BLOOD SUGAR DAILY  Dispense: 100 each; Refill: 3  -     lisinopril (PRINIVIL,ZESTRIL) 40 MG tablet; Take 1 tablet by mouth Daily.  Dispense: 90 tablet; Refill: 3  -     pioglitazone (ACTOS) 45 MG tablet; Take 1 tablet by mouth Daily.  Dispense: 90 tablet; Refill: 3  -     Semaglutide, 2 MG/DOSE, (Ozempic, 2 MG/DOSE,) 8 MG/3ML solution pen-injector; Inject 2 mg under the skin into the appropriate area as directed 1 (One) Time Per Week.  Dispense: 9 mL; Refill: 3  -     rosuvastatin (CRESTOR) 20 MG tablet; Take 1 tablet by mouth Every Night.  Dispense: 90 tablet; Refill: 3      Current Outpatient Medications   Medication Instructions    Aspirin Buf,CaCarb-MgCarb-MgO, 81 MG tablet 1 tablet, Daily    Blood Glucose Monitoring Suppl (OneTouch Verio) w/Device kit 1 Device, Does not apply, See Admin Instructions, Use to check blood sugar daily.    empagliflozin (JARDIANCE) 25 mg, Oral, Daily    glimepiride (AMARYL) 4 mg, Oral, 2 Times Daily    glucose blood (OneTouch Verio) test strip USE TO TEST BLOOD SUGAR DAILY    ketoconazole (NIZORAL) 2 % cream USE AS DIRECTED DAILY ON FEET    Lancets (onetouch ultrasoft) lancets Use to test blood sugar daily.    lisinopril (PRINIVIL,ZESTRIL) 40 mg, Oral, Daily    metFORMIN (GLUCOPHAGE) 1,000 mg, Oral, 2 Times Daily    Ozempic (2 MG/DOSE) 2 mg, Subcutaneous, Weekly    pioglitazone (ACTOS) 45 mg, Oral, Daily    rosuvastatin (CRESTOR) 20 mg, Oral, Nightly      Return in about 3 months (around 12/14/2023) for Recheck with A1c.    Adan Swain MD    09/14/2023

## 2023-09-14 NOTE — ASSESSMENT & PLAN NOTE
Diabetes is worsening.   We discussed treatment options.   He is on max meds without starting insulin.  He is reluctant to start insulin.  We discussed limiting mealtime carbs to around 30g with each meal.  Diabetes will be reassessed in 3 months.

## 2023-09-14 NOTE — PROGRESS NOTES
Pt seen for nutrition and carb counting review per MD request. Dexcom download shows post-prandial hyperglycemia. Reviewed 24hr diet recall and discussed food pairings to better manage BG. Overall, pt feels he is not eating large portions at meals but has some room for improvement with snack choices. Also discussed incorporating more activity to help with BG levels, pt will try going for short walks after meals to reduce hyperglycemia.  
(0) No visual loss

## 2023-09-15 LAB
ALBUMIN UR-MCNC: <1.2 MG/DL
CREAT UR-MCNC: 45.4 MG/DL
MICROALBUMIN/CREAT UR: NORMAL MG/G{CREAT}

## 2023-10-16 ENCOUNTER — TELEPHONE (OUTPATIENT)
Dept: ENDOCRINOLOGY | Facility: CLINIC | Age: 63
End: 2023-10-16
Payer: COMMERCIAL

## 2023-10-16 NOTE — TELEPHONE ENCOUNTER
Hub staff attempted to follow warm transfer process and was unsuccessful     Caller: Vu Alberto    Relationship to patient: Self    Best call back number: [phone]    Patient is needing: PT IS NEEDING AN ALLTERNATIVE TO OZEMPIC, PHARMACY IS OUT OF STOCK AND PT GOES OUT OF TOWN ON FRIDAY

## 2023-10-17 RX ORDER — DULAGLUTIDE 3 MG/.5ML
3 INJECTION, SOLUTION SUBCUTANEOUS WEEKLY
Qty: 2 ML | Refills: 4 | Status: SHIPPED | OUTPATIENT
Start: 2023-10-17

## 2024-02-12 ENCOUNTER — OFFICE VISIT (OUTPATIENT)
Dept: ENDOCRINOLOGY | Facility: CLINIC | Age: 64
End: 2024-02-12
Payer: COMMERCIAL

## 2024-02-12 VITALS
SYSTOLIC BLOOD PRESSURE: 110 MMHG | HEART RATE: 64 BPM | DIASTOLIC BLOOD PRESSURE: 70 MMHG | BODY MASS INDEX: 33.63 KG/M2 | HEIGHT: 64 IN | WEIGHT: 197 LBS | OXYGEN SATURATION: 97 %

## 2024-02-12 DIAGNOSIS — E78.2 MIXED HYPERLIPIDEMIA: ICD-10-CM

## 2024-02-12 DIAGNOSIS — E11.65 UNCONTROLLED TYPE 2 DIABETES MELLITUS WITH HYPERGLYCEMIA: Primary | ICD-10-CM

## 2024-02-12 DIAGNOSIS — I10 BENIGN HYPERTENSION: ICD-10-CM

## 2024-02-12 LAB
EXPIRATION DATE: ABNORMAL
EXPIRATION DATE: ABNORMAL
GLUCOSE BLDC GLUCOMTR-MCNC: 182 MG/DL (ref 70–130)
HBA1C MFR BLD: 8.3 % (ref 4.5–5.7)
Lab: ABNORMAL
Lab: ABNORMAL

## 2024-02-12 RX ORDER — ROSUVASTATIN CALCIUM 20 MG/1
20 TABLET, COATED ORAL NIGHTLY
Qty: 90 TABLET | Refills: 3 | Status: SHIPPED | OUTPATIENT
Start: 2024-02-12

## 2024-02-12 RX ORDER — GLIMEPIRIDE 4 MG/1
4 TABLET ORAL 2 TIMES DAILY
Qty: 180 TABLET | Refills: 3 | Status: SHIPPED | OUTPATIENT
Start: 2024-02-12

## 2024-02-12 RX ORDER — KETOCONAZOLE 20 MG/G
CREAM TOPICAL
Qty: 60 G | Refills: 1 | Status: SHIPPED | OUTPATIENT
Start: 2024-02-12

## 2024-02-12 RX ORDER — SEMAGLUTIDE 2.68 MG/ML
2 INJECTION, SOLUTION SUBCUTANEOUS WEEKLY
Qty: 9 ML | Refills: 3 | Status: SHIPPED | OUTPATIENT
Start: 2024-02-12

## 2024-02-12 RX ORDER — PIOGLITAZONEHYDROCHLORIDE 45 MG/1
45 TABLET ORAL DAILY
Qty: 90 TABLET | Refills: 3 | Status: SHIPPED | OUTPATIENT
Start: 2024-02-12

## 2024-02-12 RX ORDER — LISINOPRIL 40 MG/1
40 TABLET ORAL DAILY
Qty: 90 TABLET | Refills: 3 | Status: SHIPPED | OUTPATIENT
Start: 2024-02-12

## 2024-02-21 ENCOUNTER — TELEPHONE (OUTPATIENT)
Dept: ENDOCRINOLOGY | Facility: CLINIC | Age: 64
End: 2024-02-21
Payer: COMMERCIAL

## 2024-08-27 ENCOUNTER — OFFICE VISIT (OUTPATIENT)
Dept: ENDOCRINOLOGY | Facility: CLINIC | Age: 64
End: 2024-08-27
Payer: MEDICARE

## 2024-08-27 VITALS
HEART RATE: 79 BPM | DIASTOLIC BLOOD PRESSURE: 72 MMHG | OXYGEN SATURATION: 98 % | BODY MASS INDEX: 34.15 KG/M2 | SYSTOLIC BLOOD PRESSURE: 114 MMHG | WEIGHT: 200 LBS | HEIGHT: 64 IN

## 2024-08-27 DIAGNOSIS — I10 BENIGN HYPERTENSION: ICD-10-CM

## 2024-08-27 DIAGNOSIS — E11.65 UNCONTROLLED TYPE 2 DIABETES MELLITUS WITH HYPERGLYCEMIA: Primary | ICD-10-CM

## 2024-08-27 DIAGNOSIS — E78.2 MIXED HYPERLIPIDEMIA: ICD-10-CM

## 2024-08-27 LAB
ALBUMIN SERPL-MCNC: 4.5 G/DL (ref 3.5–5.2)
ALBUMIN UR-MCNC: <1.2 MG/DL
ALBUMIN/GLOB SERPL: 1.7 G/DL
ALP SERPL-CCNC: 75 U/L (ref 39–117)
ALT SERPL W P-5'-P-CCNC: 26 U/L (ref 1–41)
ANION GAP SERPL CALCULATED.3IONS-SCNC: 13.3 MMOL/L (ref 5–15)
AST SERPL-CCNC: 24 U/L (ref 1–40)
BILIRUB SERPL-MCNC: 0.3 MG/DL (ref 0–1.2)
BUN SERPL-MCNC: 12 MG/DL (ref 8–23)
BUN/CREAT SERPL: 19.7 (ref 7–25)
CALCIUM SPEC-SCNC: 9.6 MG/DL (ref 8.6–10.5)
CHLORIDE SERPL-SCNC: 96 MMOL/L (ref 98–107)
CHOLEST SERPL-MCNC: 113 MG/DL (ref 0–200)
CO2 SERPL-SCNC: 25.7 MMOL/L (ref 22–29)
CREAT SERPL-MCNC: 0.61 MG/DL (ref 0.76–1.27)
CREAT UR-MCNC: 53.5 MG/DL
EGFRCR SERPLBLD CKD-EPI 2021: 106.6 ML/MIN/1.73
EXPIRATION DATE: ABNORMAL
EXPIRATION DATE: NORMAL
GLOBULIN UR ELPH-MCNC: 2.6 GM/DL
GLUCOSE BLDC GLUCOMTR-MCNC: 104 MG/DL (ref 70–130)
GLUCOSE SERPL-MCNC: 102 MG/DL (ref 65–99)
HBA1C MFR BLD: 8 % (ref 4.5–5.7)
HDLC SERPL-MCNC: 35 MG/DL (ref 40–60)
LDLC SERPL CALC-MCNC: 53 MG/DL (ref 0–100)
LDLC/HDLC SERPL: 1.39 {RATIO}
Lab: ABNORMAL
Lab: NORMAL
MICROALBUMIN/CREAT UR: NORMAL MG/G{CREAT}
POTASSIUM SERPL-SCNC: 4.6 MMOL/L (ref 3.5–5.2)
PROT SERPL-MCNC: 7.1 G/DL (ref 6–8.5)
SODIUM SERPL-SCNC: 135 MMOL/L (ref 136–145)
TRIGL SERPL-MCNC: 146 MG/DL (ref 0–150)
TSH SERPL DL<=0.05 MIU/L-ACNC: 1.72 UIU/ML (ref 0.27–4.2)
VLDLC SERPL-MCNC: 25 MG/DL (ref 5–40)

## 2024-08-27 PROCEDURE — 84443 ASSAY THYROID STIM HORMONE: CPT | Performed by: INTERNAL MEDICINE

## 2024-08-27 PROCEDURE — 80053 COMPREHEN METABOLIC PANEL: CPT | Performed by: INTERNAL MEDICINE

## 2024-08-27 PROCEDURE — 82570 ASSAY OF URINE CREATININE: CPT | Performed by: INTERNAL MEDICINE

## 2024-08-27 PROCEDURE — 80061 LIPID PANEL: CPT | Performed by: INTERNAL MEDICINE

## 2024-08-27 PROCEDURE — 82043 UR ALBUMIN QUANTITATIVE: CPT | Performed by: INTERNAL MEDICINE

## 2024-08-27 NOTE — PROGRESS NOTES
"     Office Note      Date: 2024  Patient Name: Vu Alberto  MRN: [id number]  : 1960    Chief Complaint   Patient presents with    Diabetes       History of Present Illness:   Vu Alberto is a 64 y.o. male who presents for Diabetes type 2. Diagnosed in: . Treated in past with oral agents. Current treatments: ozempic, glimepiride, metformin, jardiance, and pioglitazone. Number of insulin shots per day: none. Checks blood sugar every other day. Has low blood sugar: no. Aspirin use: Yes. Statin use: Yes. ACE-I/ARB use: Yes. Changes in health since last visit: none. Last eye exam 2024.      Subjective      Diabetic Complications:  Eyes: No  Kidneys: No  Feet: No  Heart: No    Diet and Exercise:  Meals per day: 3  Minutes of exercise per week: 150 mins.    Review of Systems:   Review of Systems   Constitutional: Negative.    Cardiovascular: Negative.    Gastrointestinal: Negative.    Endocrine: Negative.        The following portions of the patient's history were reviewed and updated as appropriate: allergies, current medications, past family history, past medical history, past social history, past surgical history, and problem list.    Objective     Visit Vitals  /72   Pulse 79   Ht 162.6 cm (64\")   Wt 90.7 kg (200 lb)   SpO2 98%   BMI 34.33 kg/m²       Physical Exam:  Physical Exam  Constitutional:       Appearance: Normal appearance.   Cardiovascular:      Pulses:           Dorsalis pedis pulses are 2+ on the right side and 2+ on the left side.        Posterior tibial pulses are 2+ on the right side and 2+ on the left side.   Feet:      Right foot:      Protective Sensation: 5 sites tested.  5 sites sensed.      Skin integrity: Dry skin present.      Toenail Condition: Right toenails are abnormally thick. Fungal disease present.     Left foot:      Protective Sensation: 5 sites tested.  5 sites sensed.      Skin integrity: Dry skin present.      Toenail Condition: Left toenails are " abnormally thick. Fungal disease present.  Neurological:      Mental Status: He is alert.         Labs:    HbA1c  Lab Results   Component Value Date    HGBA1C 8.0 (A) 08/27/2024       CMP  Lab Results   Component Value Date    GLUCOSE 108 (H) 09/14/2023    BUN 11 09/14/2023    CREATININE 0.73 (L) 09/14/2023    EGFRIFNONA 137 05/21/2021    EGFRIFAFRI >150 05/21/2021    BCR 15.1 09/14/2023    K 5.4 (H) 09/14/2023    CO2 25.0 09/14/2023    CALCIUM 10.2 09/14/2023    AST 29 09/14/2023    ALT 33 09/14/2023        Lipid Panel  Lab Results   Component Value Date    HDL 34 (L) 09/14/2023    LDL 36 09/14/2023    TRIG 180 (H) 09/14/2023        TSH  Lab Results   Component Value Date    TSH 1.540 09/14/2023        Hemoglobin A1C  Lab Results   Component Value Date    HGBA1C 8.0 (A) 08/27/2024        Microalbumin/Creatinine  Lab Results   Component Value Date    MALBCRERATIO  09/14/2023      Comment:      Unable to calculate    MICROALBUR <1.2 09/14/2023           Assessment / Plan      Assessment & Plan:  Diagnoses and all orders for this visit:    1. Uncontrolled type 2 diabetes mellitus with hyperglycemia (Primary)  Assessment & Plan:  Diabetes is improving with treatment.   Continue current treatment regimen.  Work on diet/exercise.  Diabetes will be reassessed in 6 months.    Orders:  -     POC Glucose, Blood  -     POC Glycosylated Hemoglobin (Hb A1C)  -     Comprehensive Metabolic Panel; Future  -     Lipid Panel; Future  -     Microalbumin / Creatinine Urine Ratio - Urine, Clean Catch; Future  -     TSH; Future    2. Benign hypertension  Assessment & Plan:  Hypertension is stable and controlled  Continue current treatment regimen.  Blood pressure will be reassessed in 6 months.      3. Mixed hyperlipidemia  Assessment & Plan:  Continue statin.  Check lipids today.        Current Outpatient Medications   Medication Instructions    Aspirin Buf,CaCarb-MgCarb-MgO, 81 MG tablet 1 tablet, Daily    Blood Glucose Monitoring  Suppl (OneTouch Verio) w/Device kit 1 Device, Does not apply, See Admin Instructions, Use to check blood sugar daily.    empagliflozin (JARDIANCE) 25 mg, Oral, Daily    glimepiride (AMARYL) 4 mg, Oral, 2 Times Daily    glucose blood (OneTouch Verio) test strip USE TO TEST BLOOD SUGAR DAILY    ketoconazole (NIZORAL) 2 % cream Use as directed daily on feet    Lancets (onetouch ultrasoft) lancets Use to test blood sugar daily.    lisinopril (PRINIVIL,ZESTRIL) 40 mg, Oral, Daily    metFORMIN (GLUCOPHAGE) 1,000 mg, Oral, 2 Times Daily    Ozempic (2 MG/DOSE) 2 mg, Subcutaneous, Weekly    pioglitazone (ACTOS) 45 mg, Oral, Daily    rosuvastatin (CRESTOR) 20 mg, Oral, Nightly      Return in about 6 months (around 2/27/2025) for Recheck with A1c, retinal photos.    Electronically signed by: Adan Swain MD  08/27/2024

## 2024-08-27 NOTE — ASSESSMENT & PLAN NOTE
Continue statin.  Check lipids today.  
Diabetes is improving with treatment.   Continue current treatment regimen.  Work on diet/exercise.  Diabetes will be reassessed in 6 months.  
Hypertension is stable and controlled  Continue current treatment regimen.  Blood pressure will be reassessed in 6 months.  
no

## 2024-10-01 RX ORDER — EMPAGLIFLOZIN 25 MG/1
25 TABLET, FILM COATED ORAL DAILY
Qty: 90 TABLET | Refills: 3 | Status: SHIPPED | OUTPATIENT
Start: 2024-10-01

## 2024-10-01 NOTE — TELEPHONE ENCOUNTER
Rx Refill Note  Requested Prescriptions     Pending Prescriptions Disp Refills    empagliflozin (Jardiance) 25 MG tablet tablet [Pharmacy Med Name: JARDIANCE 25MG TABLETS] 90 tablet 1     Sig: TAKE 1 TABLET BY MOUTH DAILY      Last office visit with prescribing clinician: 8/27/2024   Last telemedicine visit with prescribing clinician: Visit date not found   Next office visit with prescribing clinician: 3/4/2025                         Would you like a call back once the refill request has been completed: [] Yes [] No    If the office needs to give you a call back, can they leave a voicemail: [] Yes [] No    Shantal Zepeda MA  10/01/24, 15:13 EDT

## 2025-01-30 RX ORDER — SEMAGLUTIDE 2.68 MG/ML
INJECTION, SOLUTION SUBCUTANEOUS
Qty: 9 ML | Refills: 0 | Status: SHIPPED | OUTPATIENT
Start: 2025-01-30

## 2025-01-30 NOTE — TELEPHONE ENCOUNTER
Rx Refill Note  Requested Prescriptions     Pending Prescriptions Disp Refills    Semaglutide, 2 MG/DOSE, (Ozempic, 2 MG/DOSE,) 8 MG/3ML solution pen-injector [Pharmacy Med Name: OZEMPIC 2MG PER DOSE (8MG/3ML) PFP] 9 mL 0     Sig: INJECT 2 MG UNDER THE SKIN INTO THE APPROPRIATE AREA ONCE WEEKLY      Last office visit with prescribing clinician: 8/27/2024     Next office visit with prescribing clinician: 3/4/2025   {Ghulam Lindsay MA  01/30/25, 11:10 EST

## 2025-02-18 RX ORDER — PIOGLITAZONE 45 MG/1
45 TABLET ORAL DAILY
Qty: 90 TABLET | Refills: 3 | Status: SHIPPED | OUTPATIENT
Start: 2025-02-18

## 2025-02-20 RX ORDER — SEMAGLUTIDE 2.68 MG/ML
2 INJECTION, SOLUTION SUBCUTANEOUS WEEKLY
Qty: 9 ML | Refills: 3 | Status: SHIPPED | OUTPATIENT
Start: 2025-02-20

## 2025-02-20 NOTE — TELEPHONE ENCOUNTER
Rx Refill Note  Requested Prescriptions     Pending Prescriptions Disp Refills    Semaglutide, 2 MG/DOSE, (Ozempic, 2 MG/DOSE,) 8 MG/3ML solution pen-injector 3 mL 0     Sig: Inject 2 mg under the skin into the appropriate area as directed 1 (One) Time Per Week.      Last office visit with prescribing clinician: 8/27/2024     Next office visit with prescribing clinician: 3/4/2025   {Ghulam Lindsay MA  02/20/25, 11:41 EST

## 2025-03-04 ENCOUNTER — OFFICE VISIT (OUTPATIENT)
Dept: ENDOCRINOLOGY | Facility: CLINIC | Age: 65
End: 2025-03-04
Payer: MEDICARE

## 2025-03-04 VITALS
WEIGHT: 201.6 LBS | HEART RATE: 90 BPM | OXYGEN SATURATION: 96 % | DIASTOLIC BLOOD PRESSURE: 50 MMHG | SYSTOLIC BLOOD PRESSURE: 118 MMHG | HEIGHT: 64 IN | BODY MASS INDEX: 34.42 KG/M2

## 2025-03-04 DIAGNOSIS — I10 BENIGN HYPERTENSION: ICD-10-CM

## 2025-03-04 DIAGNOSIS — E78.2 MIXED HYPERLIPIDEMIA: ICD-10-CM

## 2025-03-04 DIAGNOSIS — E11.65 UNCONTROLLED TYPE 2 DIABETES MELLITUS WITH HYPERGLYCEMIA: Primary | ICD-10-CM

## 2025-03-04 LAB
EXPIRATION DATE: ABNORMAL
EXPIRATION DATE: ABNORMAL
GLUCOSE BLDC GLUCOMTR-MCNC: 277 MG/DL (ref 70–130)
HBA1C MFR BLD: 8.6 % (ref 4.5–5.7)
Lab: ABNORMAL
Lab: ABNORMAL

## 2025-03-04 RX ORDER — LISINOPRIL 40 MG/1
40 TABLET ORAL DAILY
Qty: 90 TABLET | Refills: 3 | Status: SHIPPED | OUTPATIENT
Start: 2025-03-04

## 2025-03-04 RX ORDER — GLIMEPIRIDE 4 MG/1
4 TABLET ORAL 2 TIMES DAILY
Qty: 180 TABLET | Refills: 3 | Status: SHIPPED | OUTPATIENT
Start: 2025-03-04

## 2025-03-04 RX ORDER — ROSUVASTATIN CALCIUM 20 MG/1
20 TABLET, COATED ORAL NIGHTLY
Qty: 90 TABLET | Refills: 3 | Status: SHIPPED | OUTPATIENT
Start: 2025-03-04

## 2025-03-04 NOTE — ASSESSMENT & PLAN NOTE
Diabetes is worsening.  He wasn't as active over the winter.  He reports recent FSBS have been better.  Work on diet/exercise.   Continue current meds.  Diabetes will be reassessed in 6 months.    Retinal photos were performed today.

## 2025-03-04 NOTE — TELEPHONE ENCOUNTER
Rx Refill Note  Requested Prescriptions     Pending Prescriptions Disp Refills    Jardiance 25 MG tablet tablet [Pharmacy Med Name: JARDIANCE 25MG TABLETS] 90 tablet 3     Sig: TAKE 1 TABLET BY MOUTH DAILY      Last office visit with prescribing clinician: 3/4/2025     Next office visit with prescribing clinician: 10/2/2025     Tammy Hidalgo MA  03/04/25, 12:21 EST

## 2025-03-04 NOTE — PROGRESS NOTES
"     Office Note      Date: 2025  Patient Name: Vu Alberto  MRN: [id number]  : 1960    Chief Complaint   Patient presents with    Diabetes     Type II    Hyperlipidemia     Mixed    Hypertension     Benign       History of Present Illness:   Vu Alberto is a 64 y.o. male who presents for Diabetes type 2. Diagnosed in: . Treated in past with oral agents. Current treatments: ozempic, glimepiride, metformin, jardiance, and pioglitazone. Number of insulin shots per day: none. Checks blood sugar every other day. Has low blood sugar: no. Aspirin use: Yes. Statin use: Yes. ACE-I/ARB use: Yes. Changes in health since last visit: none. Last eye exam 2024.     Subjective      Diabetic Complications:  Eyes: No  Kidneys: No  Feet: No  Heart: No    Diet and Exercise:  Meals per day: 3  Minutes of exercise per week: 150 mins.    Review of Systems:   Review of Systems   Constitutional: Negative.    Cardiovascular: Negative.    Gastrointestinal: Negative.    Endocrine: Negative.        The following portions of the patient's history were reviewed and updated as appropriate: allergies, current medications, past family history, past medical history, past social history, past surgical history, and problem list.    Objective     Visit Vitals  /50 (BP Location: Left arm, Patient Position: Sitting, Cuff Size: Adult)   Pulse 90   Ht 162.6 cm (64.02\")   Wt 91.4 kg (201 lb 9.6 oz)   SpO2 96%   BMI 34.59 kg/m²       Physical Exam:  Physical Exam  Constitutional:       Appearance: Normal appearance.   Neurological:      Mental Status: He is alert.         Labs:    HbA1c  Lab Results   Component Value Date    HGBA1C 8.6 (A) 2025       CMP  Lab Results   Component Value Date    GLUCOSE 102 (H) 2024    BUN 12 2024    CREATININE 0.61 (L) 2024    EGFRIFNONA 137 2021    EGFRIFAFRI >150 2021    BCR 19.7 2024    K 4.6 2024    CO2 25.7 2024    CALCIUM 9.6 2024    " "AST 24 08/27/2024    ALT 26 08/27/2024        Lipid Panel  Lab Results   Component Value Date    HDL Cholesterol 35 (L) 08/27/2024    LDL Cholesterol  53 08/27/2024    LDL/HDL Ratio 1.39 08/27/2024    Triglycerides 146 08/27/2024        TSH  Lab Results   Component Value Date    TSH 1.720 08/27/2024        Hemoglobin A1C  No components found for: \"HGBA1C\"     Microalbumin/Creatinine  Lab Results   Component Value Date    MALBCRERATIO  08/27/2024      Comment:      Unable to calculate    MICROALBUR <1.2 08/27/2024           Assessment / Plan      Assessment & Plan:  Diagnoses and all orders for this visit:    1. Uncontrolled type 2 diabetes mellitus with hyperglycemia (Primary)  Assessment & Plan:  Diabetes is worsening.  He wasn't as active over the winter.  He reports recent FSBS have been better.  Work on diet/exercise.   Continue current meds.  Diabetes will be reassessed in 6 months.    Retinal photos were performed today.    Orders:  -     POC Glucose, Blood  -     POC Glycosylated Hemoglobin (Hb A1C)  -     metFORMIN (GLUCOPHAGE) 1000 MG tablet; Take 1 tablet by mouth 2 (Two) Times a Day.  Dispense: 180 tablet; Refill: 3    2. Benign hypertension  Assessment & Plan:  Hypertension is stable and controlled.  Continue current treatment regimen.  Blood pressure will be reassessed in 6 months.      3. Mixed hyperlipidemia  Assessment & Plan:  Continue statin.  Lipids were okay last visit.      Other orders  -     glimepiride (AMARYL) 4 MG tablet; Take 1 tablet by mouth 2 (Two) Times a Day.  Dispense: 180 tablet; Refill: 3  -     lisinopril (PRINIVIL,ZESTRIL) 40 MG tablet; Take 1 tablet by mouth Daily.  Dispense: 90 tablet; Refill: 3  -     rosuvastatin (CRESTOR) 20 MG tablet; Take 1 tablet by mouth Every Night.  Dispense: 90 tablet; Refill: 3      Current Outpatient Medications   Medication Instructions    Aspirin Buf,CaCarb-MgCarb-MgO, 81 MG tablet 1 tablet, Daily    Blood Glucose Monitoring Suppl (OneTouch Verio) " w/Device kit 1 Device, Not Applicable, See Admin Instructions, Use to check blood sugar daily.    glimepiride (AMARYL) 4 mg, Oral, 2 Times Daily    glucose blood (OneTouch Verio) test strip USE TO TEST BLOOD SUGAR DAILY    Jardiance 25 mg, Oral, Daily    ketoconazole (NIZORAL) 2 % cream Use as directed daily on feet    Lancets (onetouch ultrasoft) lancets Use to test blood sugar daily.    lisinopril (PRINIVIL,ZESTRIL) 40 mg, Oral, Daily    metFORMIN (GLUCOPHAGE) 1,000 mg, Oral, 2 Times Daily    Ozempic (2 MG/DOSE) 2 mg, Subcutaneous, Weekly    pioglitazone (ACTOS) 45 mg, Oral, Daily    rosuvastatin (CRESTOR) 20 mg, Oral, Nightly      Return in about 6 months (around 9/4/2025) for Recheck with A1c, CMP, lipid, TSH, microalbumin, foot exam.    Electronically signed by: Adan Swain MD  03/04/2025

## 2025-03-06 RX ORDER — EMPAGLIFLOZIN 25 MG/1
25 TABLET, FILM COATED ORAL DAILY
Qty: 90 TABLET | Refills: 3 | Status: SHIPPED | OUTPATIENT
Start: 2025-03-06

## 2025-03-06 NOTE — TELEPHONE ENCOUNTER
Caller: [id number] - [place], [place] - [address] AT [facility] & [address] [facility] - [phone] [place] [phone] FX    Relationship: Pharmacy    Best call back number: [phone]    Requested Prescriptions:  empagliflozin (Jardiance) 25 MG tablet tablet  Requested Prescriptions     Pending Prescriptions Disp Refills    Jardiance 25 MG tablet tablet [Pharmacy Med Name: JARDIANCE 25MG TABLETS] 90 tablet 3     Sig: TAKE 1 TABLET BY MOUTH DAILY        Pharmacy where request should be sent: [id number] - [place], [place] - [address] AT [facility] & [address] [facility] [phone] [place] [phone] FX     Last office visit with prescribing clinician: 3/4/2025   Last telemedicine visit with prescribing clinician: Visit date not found   Next office visit with prescribing clinician: 10/2/2025     Additional details provided by patient: 2ND REQUEST BY PHARMACY    Does the patient have less than a 3 day supply:  [x] Yes  [] No    Would you like a call back once the refill request has been completed: [x] Yes [] No    If the office needs to give you a call back, can they leave a voicemail: [x] Yes [] No    Nina Wade Rep   03/06/25 10:43 EST

## 2025-03-17 ENCOUNTER — TELEPHONE (OUTPATIENT)
Dept: ENDOCRINOLOGY | Facility: CLINIC | Age: 65
End: 2025-03-17
Payer: MEDICARE

## 2025-07-09 RX ORDER — KETOCONAZOLE 20 MG/G
CREAM TOPICAL
Qty: 60 G | Refills: 1 | Status: SHIPPED | OUTPATIENT
Start: 2025-07-09

## 2025-07-09 NOTE — TELEPHONE ENCOUNTER
Rx Refill Note  Requested Prescriptions     Pending Prescriptions Disp Refills    ketoconazole (NIZORAL) 2 % cream [Pharmacy Med Name: KETOCONAZOLE 2% CREAM 60GM] 60 g 1     Sig: APPLY EVERY DAY AS DIRECTED ON FEET      Last office visit with prescribing clinician: 3/4/2025   Last telemedicine visit with prescribing clinician: Visit date not found   Next office visit with prescribing clinician: 10/2/2025                         Would you like a call back once the refill request has been completed: [] Yes [] No    If the office needs to give you a call back, can they leave a voicemail: [] Yes [] No    Shantal Zepeda MA  07/09/25, 10:35 EDT